# Patient Record
Sex: MALE | Race: WHITE | NOT HISPANIC OR LATINO
[De-identification: names, ages, dates, MRNs, and addresses within clinical notes are randomized per-mention and may not be internally consistent; named-entity substitution may affect disease eponyms.]

---

## 2017-10-03 ENCOUNTER — APPOINTMENT (OUTPATIENT)
Dept: UROLOGY | Facility: CLINIC | Age: 62
End: 2017-10-03
Payer: COMMERCIAL

## 2017-10-03 DIAGNOSIS — I10 ESSENTIAL (PRIMARY) HYPERTENSION: ICD-10-CM

## 2017-10-03 DIAGNOSIS — F41.9 ANXIETY DISORDER, UNSPECIFIED: ICD-10-CM

## 2017-10-03 DIAGNOSIS — Z82.0 FAMILY HISTORY OF EPILEPSY AND OTHER DISEASES OF THE NERVOUS SYSTEM: ICD-10-CM

## 2017-10-03 DIAGNOSIS — Z82.3 FAMILY HISTORY OF STROKE: ICD-10-CM

## 2017-10-03 DIAGNOSIS — Z78.9 OTHER SPECIFIED HEALTH STATUS: ICD-10-CM

## 2017-10-03 PROCEDURE — 99204 OFFICE O/P NEW MOD 45 MIN: CPT

## 2017-10-03 RX ORDER — CLONAZEPAM 1 MG/1
1 TABLET ORAL
Refills: 0 | Status: ACTIVE | COMMUNITY

## 2017-10-03 RX ORDER — PAROXETINE HYDROCHLORIDE 40 MG/1
TABLET, FILM COATED ORAL
Refills: 0 | Status: ACTIVE | COMMUNITY

## 2017-10-03 RX ORDER — IRBESARTAN 300 MG/1
TABLET, FILM COATED ORAL
Refills: 0 | Status: ACTIVE | COMMUNITY

## 2017-10-03 RX ORDER — ATENOLOL 50 MG/1
50 TABLET ORAL
Refills: 0 | Status: ACTIVE | COMMUNITY

## 2017-10-03 RX ORDER — ASPIRIN 81 MG
81 TABLET, DELAYED RELEASE (ENTERIC COATED) ORAL
Refills: 0 | Status: ACTIVE | COMMUNITY

## 2017-10-25 ENCOUNTER — MESSAGE (OUTPATIENT)
Age: 62
End: 2017-10-25

## 2017-11-22 ENCOUNTER — RX RENEWAL (OUTPATIENT)
Age: 62
End: 2017-11-22

## 2017-11-22 ENCOUNTER — APPOINTMENT (OUTPATIENT)
Dept: UROLOGY | Facility: CLINIC | Age: 62
End: 2017-11-22
Payer: COMMERCIAL

## 2017-11-22 VITALS
HEIGHT: 73 IN | DIASTOLIC BLOOD PRESSURE: 86 MMHG | HEART RATE: 70 BPM | WEIGHT: 280 LBS | SYSTOLIC BLOOD PRESSURE: 143 MMHG | BODY MASS INDEX: 37.11 KG/M2

## 2017-11-22 LAB
BILIRUB UR QL STRIP: NORMAL
CLARITY UR: CLEAR
COLLECTION METHOD: NORMAL
GLUCOSE UR-MCNC: NORMAL
HCG UR QL: NORMAL EU/DL
HGB UR QL STRIP.AUTO: NORMAL
KETONES UR-MCNC: NORMAL
LEUKOCYTE ESTERASE UR QL STRIP: 25
NITRITE UR QL STRIP: NORMAL
PH UR STRIP: NORMAL
PROT UR STRIP-MCNC: NORMAL
SP GR UR STRIP: 1.01

## 2017-11-22 PROCEDURE — 81003 URINALYSIS AUTO W/O SCOPE: CPT | Mod: QW

## 2017-11-22 PROCEDURE — 99213 OFFICE O/P EST LOW 20 MIN: CPT

## 2017-12-18 ENCOUNTER — OUTPATIENT (OUTPATIENT)
Dept: OUTPATIENT SERVICES | Facility: HOSPITAL | Age: 62
LOS: 1 days | Discharge: HOME | End: 2017-12-18

## 2017-12-18 DIAGNOSIS — F93.0 SEPARATION ANXIETY DISORDER OF CHILDHOOD: ICD-10-CM

## 2017-12-18 DIAGNOSIS — I10 ESSENTIAL (PRIMARY) HYPERTENSION: ICD-10-CM

## 2017-12-18 DIAGNOSIS — L03.90 CELLULITIS, UNSPECIFIED: ICD-10-CM

## 2017-12-22 DIAGNOSIS — R94.31 ABNORMAL ELECTROCARDIOGRAM [ECG] [EKG]: ICD-10-CM

## 2017-12-22 DIAGNOSIS — E78.4 OTHER HYPERLIPIDEMIA: ICD-10-CM

## 2017-12-22 DIAGNOSIS — F41.8 OTHER SPECIFIED ANXIETY DISORDERS: ICD-10-CM

## 2017-12-22 DIAGNOSIS — I10 ESSENTIAL (PRIMARY) HYPERTENSION: ICD-10-CM

## 2017-12-22 DIAGNOSIS — E66.9 OBESITY, UNSPECIFIED: ICD-10-CM

## 2017-12-22 DIAGNOSIS — Z82.49 FAMILY HISTORY OF ISCHEMIC HEART DISEASE AND OTHER DISEASES OF THE CIRCULATORY SYSTEM: ICD-10-CM

## 2017-12-22 DIAGNOSIS — Z87.891 PERSONAL HISTORY OF NICOTINE DEPENDENCE: ICD-10-CM

## 2018-01-25 ENCOUNTER — APPOINTMENT (OUTPATIENT)
Dept: UROLOGY | Facility: CLINIC | Age: 63
End: 2018-01-25
Payer: COMMERCIAL

## 2018-01-25 LAB
BILIRUB UR QL STRIP: NORMAL
CLARITY UR: CLEAR
COLLECTION METHOD: NORMAL
GLUCOSE UR-MCNC: NORMAL
HCG UR QL: NORMAL EU/DL
HGB UR QL STRIP.AUTO: NORMAL
KETONES UR-MCNC: NORMAL
LEUKOCYTE ESTERASE UR QL STRIP: 75
NITRITE UR QL STRIP: NORMAL
PH UR STRIP: 6.5
PROT UR STRIP-MCNC: NORMAL
SP GR UR STRIP: 1.01

## 2018-01-25 PROCEDURE — 99214 OFFICE O/P EST MOD 30 MIN: CPT

## 2018-01-25 PROCEDURE — 81003 URINALYSIS AUTO W/O SCOPE: CPT | Mod: QW

## 2018-01-25 RX ORDER — DILTIAZEM HYDROCHLORIDE 360 MG/1
360 CAPSULE, COATED, EXTENDED RELEASE ORAL
Refills: 0 | Status: COMPLETED | COMMUNITY
End: 2018-01-25

## 2018-02-01 ENCOUNTER — OUTPATIENT (OUTPATIENT)
Dept: OUTPATIENT SERVICES | Facility: HOSPITAL | Age: 63
LOS: 1 days | Discharge: HOME | End: 2018-02-01

## 2018-02-01 DIAGNOSIS — N20.2 CALCULUS OF KIDNEY WITH CALCULUS OF URETER: ICD-10-CM

## 2018-02-01 DIAGNOSIS — Z01.818 ENCOUNTER FOR OTHER PREPROCEDURAL EXAMINATION: ICD-10-CM

## 2018-02-04 DIAGNOSIS — F93.0 SEPARATION ANXIETY DISORDER OF CHILDHOOD: ICD-10-CM

## 2018-02-04 DIAGNOSIS — L03.90 CELLULITIS, UNSPECIFIED: ICD-10-CM

## 2018-02-04 DIAGNOSIS — I10 ESSENTIAL (PRIMARY) HYPERTENSION: ICD-10-CM

## 2018-02-12 ENCOUNTER — TRANSCRIPTION ENCOUNTER (OUTPATIENT)
Age: 63
End: 2018-02-12

## 2018-02-12 ENCOUNTER — APPOINTMENT (OUTPATIENT)
Dept: UROLOGY | Facility: HOSPITAL | Age: 63
End: 2018-02-12
Payer: COMMERCIAL

## 2018-02-12 ENCOUNTER — OUTPATIENT (OUTPATIENT)
Dept: OUTPATIENT SERVICES | Facility: HOSPITAL | Age: 63
LOS: 1 days | Discharge: HOME | End: 2018-02-12

## 2018-02-12 VITALS
OXYGEN SATURATION: 98 % | TEMPERATURE: 98 F | WEIGHT: 285.94 LBS | DIASTOLIC BLOOD PRESSURE: 89 MMHG | HEIGHT: 71 IN | SYSTOLIC BLOOD PRESSURE: 142 MMHG | RESPIRATION RATE: 12 BRPM | HEART RATE: 70 BPM

## 2018-02-12 VITALS — RESPIRATION RATE: 18 BRPM | DIASTOLIC BLOOD PRESSURE: 70 MMHG | SYSTOLIC BLOOD PRESSURE: 132 MMHG | HEART RATE: 67 BPM

## 2018-02-12 DIAGNOSIS — Z98.890 OTHER SPECIFIED POSTPROCEDURAL STATES: Chronic | ICD-10-CM

## 2018-02-12 PROCEDURE — 50590 FRAGMENTING OF KIDNEY STONE: CPT | Mod: LT

## 2018-02-12 RX ORDER — OXYCODONE AND ACETAMINOPHEN 5; 325 MG/1; MG/1
2 TABLET ORAL ONCE
Qty: 0 | Refills: 0 | Status: DISCONTINUED | OUTPATIENT
Start: 2018-02-12 | End: 2018-02-13

## 2018-02-12 RX ORDER — CEFUROXIME AXETIL 250 MG
1 TABLET ORAL
Qty: 6 | Refills: 0
Start: 2018-02-12 | End: 2018-02-14

## 2018-02-12 RX ORDER — KETOROLAC TROMETHAMINE 30 MG/ML
30 SYRINGE (ML) INJECTION ONCE
Qty: 0 | Refills: 0 | Status: DISCONTINUED | OUTPATIENT
Start: 2018-02-12 | End: 2018-02-13

## 2018-02-12 RX ORDER — MORPHINE SULFATE 50 MG/1
2 CAPSULE, EXTENDED RELEASE ORAL
Qty: 0 | Refills: 0 | Status: DISCONTINUED | OUTPATIENT
Start: 2018-02-12 | End: 2018-02-12

## 2018-02-12 RX ORDER — ONDANSETRON 8 MG/1
4 TABLET, FILM COATED ORAL ONCE
Qty: 0 | Refills: 0 | Status: DISCONTINUED | OUTPATIENT
Start: 2018-02-12 | End: 2018-02-13

## 2018-02-12 RX ORDER — SODIUM CHLORIDE 9 MG/ML
1000 INJECTION, SOLUTION INTRAVENOUS
Qty: 0 | Refills: 0 | Status: DISCONTINUED | OUTPATIENT
Start: 2018-02-12 | End: 2018-02-27

## 2018-02-12 RX ORDER — MORPHINE SULFATE 50 MG/1
4 CAPSULE, EXTENDED RELEASE ORAL ONCE
Qty: 0 | Refills: 0 | Status: DISCONTINUED | OUTPATIENT
Start: 2018-02-12 | End: 2018-02-13

## 2018-02-12 RX ORDER — PREGABALIN 225 MG/1
1 CAPSULE ORAL
Qty: 0 | Refills: 0 | COMMUNITY

## 2018-02-12 NOTE — PRE-ANESTHESIA EVALUATION ADULT - NSANTHOSAYNRD_GEN_A_CORE
No. RAMOS screening performed.  STOP BANG Legend: 0-2 = LOW Risk; 3-4 = INTERMEDIATE Risk; 5-8 = HIGH Risk

## 2018-02-12 NOTE — DISCHARGE NOTE ADULT - PATIENT PORTAL LINK FT
You can access the [facility] Patient Portal, offered by [facility], by registering with the following website: [email or web address]

## 2018-02-12 NOTE — ASU DISCHARGE PLAN (ADULT/PEDIATRIC). - NOTIFY
Unable to Urinate/Fever greater than 101/Bleeding that does not stop/Inability to Tolerate Liquids or Foods/Pain not relieved by Medications

## 2018-02-12 NOTE — BRIEF OPERATIVE NOTE - PRE-OP DX
Renal calculus, left  02/12/2018  10 mm mp calculus // 11x 8 mm up calculus  Active  [place] DOMÍNGUEZ

## 2018-02-12 NOTE — DISCHARGE NOTE ADULT - CARE PLAN
Principal Discharge DX:	Renal calculus, left  Goal:	home  Assessment and plan of treatment:	follow instructions and call  now office for appt in 3 weeks

## 2018-02-12 NOTE — DISCHARGE NOTE ADULT - MEDICATION SUMMARY - MEDICATIONS TO TAKE
I will START or STAY ON the medications listed below when I get home from the hospital:    Aspir 81 oral delayed release tablet  -- 1 tab(s) by mouth once a day  -- Indication: For History of cardiac cath    irbesartan 300 mg oral tablet  -- 1 tab(s) by mouth once a day  -- Indication: For Hypertension    clonazePAM 0.25 mg oral tablet, disintegrating  -- 1  by mouth once a day  -- Indication: For Anxiety    Paxil 40 mg oral tablet  -- 1 tab(s) by mouth once a day  -- Indication: For Anxiety    atenolol 50 mg oral tablet  -- 1 tab(s) by mouth once a day  -- Indication: For Hypertension    amLODIPine 10 mg oral tablet  -- 1 tab(s) by mouth once a day  -- Indication: For Hypertension    cefuroxime 500 mg oral tablet  -- 1 tab(s) by mouth 2 times a day   -- Finish all this medication unless otherwise directed by prescriber.  Medication should be taken with plenty of water.  Take with food or milk.    -- Indication: For Kidney calculi    hydroCHLOROthiazide 25 mg oral tablet  -- 1 tab(s) by mouth once a day  -- Indication: For Hypertension    Fish Oil 1000 mg oral capsule  -- 1 dose(s) orally  -- Indication: For Hypertension    oxybutynin 10 mg/24 hr oral tablet, extended release  -- 1 tab(s) by mouth once a day  -- Indication: For No idea    Multi-Day Plus Minerals oral tablet  -- 1 tab(s) by mouth once a day  -- Indication: For Hypertension

## 2018-02-21 DIAGNOSIS — N20.0 CALCULUS OF KIDNEY: ICD-10-CM

## 2018-03-01 ENCOUNTER — APPOINTMENT (OUTPATIENT)
Dept: UROLOGY | Facility: CLINIC | Age: 63
End: 2018-03-01
Payer: COMMERCIAL

## 2018-03-01 VITALS — DIASTOLIC BLOOD PRESSURE: 84 MMHG | HEART RATE: 71 BPM | SYSTOLIC BLOOD PRESSURE: 140 MMHG

## 2018-03-01 LAB
BILIRUB UR QL STRIP: NORMAL
CLARITY UR: CLEAR
COLLECTION METHOD: NORMAL
GLUCOSE UR-MCNC: NORMAL
HCG UR QL: NORMAL EU/DL
HGB UR QL STRIP.AUTO: NORMAL
KETONES UR-MCNC: NORMAL
LEUKOCYTE ESTERASE UR QL STRIP: 75
NITRITE UR QL STRIP: NORMAL
PH UR STRIP: 6
PROT UR STRIP-MCNC: NORMAL
SP GR UR STRIP: 1.01

## 2018-03-01 PROCEDURE — 99213 OFFICE O/P EST LOW 20 MIN: CPT | Mod: 25

## 2018-03-01 PROCEDURE — 81003 URINALYSIS AUTO W/O SCOPE: CPT | Mod: QW

## 2018-06-02 NOTE — ASU PREOP CHECKLIST - COMMENTS
History of Present Illness





- General


Chief Complaint: Pain


Stated Complaint: RT HAND PAIN


Time Seen by Provider: 06/02/18 01:41


History Source: Patient


Exam Limitations: No Limitations





- History of Present Illness


Initial Comments: 





06/02/18 03:12


This is a right-handed 23-year-old male without significant past medical 

history presents with right hand pain status post getting hand caught between 

clothes washer and the wall. Patient states she was helping his father move his 

washing machine 3 days ago when he lost balance causing him to tilt to the side 

catching his right hand between the washing machine and the concrete wall. 

Patient states he believed the pain and swelling would have improved without 

intervention but was concerned when the swelling remained after the 3 days.








Past History





- Past Medical History


Allergies/Adverse Reactions: 


 Allergies











Allergy/AdvReac Type Severity Reaction Status Date / Time


 


No Known Allergies Allergy   Verified 06/02/18 01:41











Home Medications: 


Ambulatory Orders





Naproxen [Naprosyn -] 500 mg PO BID PRN #20 tablet 11/11/16 








COPD: No





- Suicide/Smoking/Psychosocial Hx


Smoking History: Never smoked


Have you smoked in the past 12 months: No


Information on smoking cessation initiated: No


Hx Alcohol Use: No


Drug/Substance Use Hx: No


Substance Use Type: None





**Review of Systems





- Review of Systems


Able to Perform ROS?: Yes


Is the patient limited English proficient: No


Constitutional: No: Symptoms Reported


HEENTM: No: Symptoms Reported


Respiratory: No: Symptoms reported


Cardiac (ROS): No: Symptoms Reported


ABD/GI: No: Symptoms Reported


: No: Symptoms Reported


Musculoskeletal: Yes: See HPI


Integumentary: No: Symptoms Reported


Neurological: No: Symptoms reported





*Physical Exam





- Vital Signs


 Last Vital Signs











Temp Pulse Resp BP Pulse Ox


 


 97.6 F   88   18   142/86   99 


 


 06/02/18 01:41  06/02/18 01:41  06/02/18 01:41  06/02/18 01:41  06/02/18 01:41














- Physical Exam


Extremity: positive: Normal Capillary Refill, Swelling (Dorsal aspect of the 

right hand over the fifth metacarpal), Other (tenderness to dorsal aspect of 

the right hand over the fifth metacarpal. full sensation noted to fingers of 

the right hand)





ED Treatment Course





- RADIOLOGY


Radiology Studies Ordered: 














 Category Date Time Status


 


 HAND- RIGHT [RAD] Stat Radiology  06/02/18 01:45 Ordered














Medical Decision Making





- Medical Decision Making





06/02/18 03:18


A/P:


23-year-old right-handed male with right hand pain status post crush injury





Swelling and tenderness to the dorsum of the right hand over the fifth 

metacarpal


Full sensation noted distal to the injury


Capillary refill less than 2 seconds


No pseudoclaw present


Bony deformity palpated to the volar aspect of the fifth metacarpal





X-ray, Motrin, reassess





X-rays read by me: Mildly displaced fracture of the neck of the fifth metacarpal


Angulation less than 40





Nurse practitioner made ulnar gutter splint applied to the right hand.





I will discharge the patient home with follow-up with orthopedics.





*DC/Admit/Observation/Transfer


Diagnosis at time of Disposition: 


Fracture of fifth metacarpal bone of right hand


Qualifiers:


 Encounter type: initial encounter Fracture type: closed Metacarpal location: 

neck 








- Discharge Dispostion


Disposition: HOME


Condition at time of disposition: Stable


Decision to Admit order: No





- Referrals


Referrals: 


Brandon Andre MD [Primary Care Provider] - 


Mack Mohan MD [Staff Physician] - 





- Patient Instructions


Additional Instructions: 


Take Tylenol or Motrin as needed for pain. Follow manufacturers instructions 

for appropriate dosage.


Apply ice for 20 minutes and removed for at least 20 minutes before reapplying 

the ice.


Whenever possible keep your hand elevated to decrease swelling.


You've been given the number for an orthopedist. Make an appointment for 

evaluation within the next 5 days.


Return to emergency department for discoloration of the fingers or hand, 

numbness or tingling to the fingers or hand, worsening pain, or any other 

concerns.


Thank you very much for choosing us to provide your emergent healthcare needs.





- Post Discharge Activity


Forms/Work/School Notes:  Back to Work pills with sip of water

## 2018-06-13 ENCOUNTER — APPOINTMENT (OUTPATIENT)
Dept: UROLOGY | Facility: CLINIC | Age: 63
End: 2018-06-13
Payer: COMMERCIAL

## 2018-06-13 VITALS
HEART RATE: 78 BPM | DIASTOLIC BLOOD PRESSURE: 77 MMHG | SYSTOLIC BLOOD PRESSURE: 149 MMHG | HEIGHT: 73 IN | BODY MASS INDEX: 37.11 KG/M2 | WEIGHT: 280 LBS

## 2018-06-13 PROCEDURE — 99214 OFFICE O/P EST MOD 30 MIN: CPT

## 2018-06-13 RX ORDER — TAMSULOSIN HYDROCHLORIDE 0.4 MG/1
0.4 CAPSULE ORAL
Qty: 30 | Refills: 5 | Status: DISCONTINUED | COMMUNITY
Start: 2017-10-03 | End: 2018-06-13

## 2018-07-23 PROBLEM — Z78.9 ALCOHOL USE: Status: INACTIVE | Noted: 2017-10-03

## 2018-08-22 ENCOUNTER — APPOINTMENT (OUTPATIENT)
Dept: UROLOGY | Facility: CLINIC | Age: 63
End: 2018-08-22
Payer: COMMERCIAL

## 2018-08-22 VITALS
DIASTOLIC BLOOD PRESSURE: 81 MMHG | HEIGHT: 73 IN | SYSTOLIC BLOOD PRESSURE: 138 MMHG | BODY MASS INDEX: 37.11 KG/M2 | HEART RATE: 65 BPM | WEIGHT: 280 LBS

## 2018-08-22 PROBLEM — F41.9 ANXIETY DISORDER, UNSPECIFIED: Chronic | Status: ACTIVE | Noted: 2018-02-12

## 2018-08-22 PROBLEM — I10 ESSENTIAL (PRIMARY) HYPERTENSION: Chronic | Status: ACTIVE | Noted: 2018-02-12

## 2018-08-22 PROBLEM — N20.0 CALCULUS OF KIDNEY: Chronic | Status: ACTIVE | Noted: 2018-02-12

## 2018-08-22 PROBLEM — F32.9 MAJOR DEPRESSIVE DISORDER, SINGLE EPISODE, UNSPECIFIED: Chronic | Status: ACTIVE | Noted: 2018-02-12

## 2018-08-22 PROBLEM — D56.3 THALASSEMIA MINOR: Chronic | Status: ACTIVE | Noted: 2018-02-12

## 2018-08-22 PROCEDURE — 99213 OFFICE O/P EST LOW 20 MIN: CPT

## 2018-08-22 PROCEDURE — 51798 US URINE CAPACITY MEASURE: CPT

## 2018-11-28 ENCOUNTER — OUTPATIENT (OUTPATIENT)
Dept: OUTPATIENT SERVICES | Facility: HOSPITAL | Age: 63
LOS: 1 days | Discharge: HOME | End: 2018-11-28

## 2018-11-28 ENCOUNTER — APPOINTMENT (OUTPATIENT)
Dept: UROLOGY | Facility: CLINIC | Age: 63
End: 2018-11-28
Payer: COMMERCIAL

## 2018-11-28 VITALS
WEIGHT: 280 LBS | HEIGHT: 73 IN | SYSTOLIC BLOOD PRESSURE: 134 MMHG | BODY MASS INDEX: 37.11 KG/M2 | DIASTOLIC BLOOD PRESSURE: 73 MMHG | HEART RATE: 78 BPM

## 2018-11-28 DIAGNOSIS — N13.8 BENIGN PROSTATIC HYPERPLASIA WITH LOWER URINARY TRACT SYMPMS: ICD-10-CM

## 2018-11-28 DIAGNOSIS — Z98.890 OTHER SPECIFIED POSTPROCEDURAL STATES: Chronic | ICD-10-CM

## 2018-11-28 DIAGNOSIS — N40.1 BENIGN PROSTATIC HYPERPLASIA WITH LOWER URINARY TRACT SYMPMS: ICD-10-CM

## 2018-11-28 PROCEDURE — 99214 OFFICE O/P EST MOD 30 MIN: CPT

## 2018-11-29 DIAGNOSIS — R97.20 ELEVATED PROSTATE SPECIFIC ANTIGEN [PSA]: ICD-10-CM

## 2018-12-11 LAB — BACTERIA UR CULT: NORMAL

## 2019-02-01 ENCOUNTER — RX RENEWAL (OUTPATIENT)
Age: 64
End: 2019-02-01

## 2019-02-06 ENCOUNTER — MESSAGE (OUTPATIENT)
Age: 64
End: 2019-02-06

## 2019-02-19 ENCOUNTER — LABORATORY RESULT (OUTPATIENT)
Age: 64
End: 2019-02-19

## 2019-02-19 ENCOUNTER — APPOINTMENT (OUTPATIENT)
Dept: UROLOGY | Facility: CLINIC | Age: 64
End: 2019-02-19
Payer: COMMERCIAL

## 2019-02-19 DIAGNOSIS — R97.20 ELEVATED PROSTATE, SPECIFIC ANTIGEN [PSA]: ICD-10-CM

## 2019-02-19 PROCEDURE — 55700: CPT

## 2019-02-19 PROCEDURE — 76872 US TRANSRECTAL: CPT

## 2019-02-20 ENCOUNTER — OUTPATIENT (OUTPATIENT)
Dept: OUTPATIENT SERVICES | Facility: HOSPITAL | Age: 64
LOS: 1 days | Discharge: HOME | End: 2019-02-20

## 2019-02-20 DIAGNOSIS — Z98.890 OTHER SPECIFIED POSTPROCEDURAL STATES: Chronic | ICD-10-CM

## 2019-02-21 DIAGNOSIS — R89.7 ABNORMAL HISTOLOGICAL FINDINGS IN SPECIMENS FROM OTHER ORGANS, SYSTEMS AND TISSUES: ICD-10-CM

## 2019-04-18 ENCOUNTER — APPOINTMENT (OUTPATIENT)
Dept: UROLOGY | Facility: CLINIC | Age: 64
End: 2019-04-18
Payer: COMMERCIAL

## 2019-04-18 VITALS — HEIGHT: 73 IN | WEIGHT: 280 LBS | BODY MASS INDEX: 37.11 KG/M2

## 2019-04-18 PROCEDURE — 99214 OFFICE O/P EST MOD 30 MIN: CPT

## 2019-07-25 ENCOUNTER — APPOINTMENT (OUTPATIENT)
Dept: UROLOGY | Facility: CLINIC | Age: 64
End: 2019-07-25
Payer: COMMERCIAL

## 2019-07-25 VITALS
WEIGHT: 280 LBS | BODY MASS INDEX: 37.11 KG/M2 | HEIGHT: 73 IN | DIASTOLIC BLOOD PRESSURE: 73 MMHG | HEART RATE: 79 BPM | SYSTOLIC BLOOD PRESSURE: 124 MMHG

## 2019-07-25 PROCEDURE — 99213 OFFICE O/P EST LOW 20 MIN: CPT

## 2019-07-25 PROCEDURE — 81003 URINALYSIS AUTO W/O SCOPE: CPT | Mod: QW

## 2019-07-25 NOTE — PHYSICAL EXAM
[General Appearance - Well Developed] : well developed [General Appearance - Well Nourished] : well nourished [Normal Appearance] : normal appearance [Well Groomed] : well groomed [General Appearance - In No Acute Distress] : no acute distress [Abdomen Tenderness] : non-tender [Abdomen Soft] : soft [Costovertebral Angle Tenderness] : no ~M costovertebral angle tenderness [Urethral Meatus] : meatus normal [Skin Color & Pigmentation] : normal skin color and pigmentation [Skin Lesions] : no skin lesions [] : no respiratory distress [Respiration, Rhythm And Depth] : normal respiratory rhythm and effort [Exaggerated Use Of Accessory Muscles For Inspiration] : no accessory muscle use [Oriented To Time, Place, And Person] : oriented to person, place, and time [Affect] : the affect was normal [Mood] : the mood was normal [Not Anxious] : not anxious [Normal Station and Gait] : the gait and station were normal for the patient's age [No Focal Deficits] : no focal deficits [No Palpable Adenopathy] : no palpable adenopathy [Motor Exam] : the motor exam was normal [FreeTextEntry1] : edema UE ~~ +2

## 2019-07-25 NOTE — ASSESSMENT
[FreeTextEntry1] : #1. Prostate carcinoma, stage TI C., group 1 , very low risk\par \par Plan\par Informational booklet\par Discuss prostate carcinoma including but not limited to, progression, recurrence, treatment options such as active surveillance, robotic prostatectomy, radiation therapy, bleeding, pain, quality of life, long-term and short-term complications of the above, incontinence erectile dysfunction.\par Patient would like to proceed with active surveillance and NCCN  protocol\par PSA 3 months

## 2019-10-10 ENCOUNTER — APPOINTMENT (OUTPATIENT)
Dept: UROLOGY | Facility: CLINIC | Age: 64
End: 2019-10-10
Payer: COMMERCIAL

## 2019-10-10 VITALS
HEIGHT: 73 IN | DIASTOLIC BLOOD PRESSURE: 76 MMHG | BODY MASS INDEX: 37.11 KG/M2 | WEIGHT: 280 LBS | HEART RATE: 76 BPM | SYSTOLIC BLOOD PRESSURE: 144 MMHG

## 2019-10-10 LAB
BILIRUB UR QL STRIP: NORMAL
CLARITY UR: CLEAR
COLLECTION METHOD: NORMAL
GLUCOSE UR-MCNC: NORMAL
HCG UR QL: NORMAL EU/DL
HGB UR QL STRIP.AUTO: NORMAL
KETONES UR-MCNC: NORMAL
LEUKOCYTE ESTERASE UR QL STRIP: 75
NITRITE UR QL STRIP: NORMAL
PH UR STRIP: 5
PROT UR STRIP-MCNC: NORMAL
SP GR UR STRIP: 1.01

## 2019-10-10 PROCEDURE — 51798 US URINE CAPACITY MEASURE: CPT

## 2019-10-10 PROCEDURE — 99213 OFFICE O/P EST LOW 20 MIN: CPT

## 2019-10-10 PROCEDURE — 81003 URINALYSIS AUTO W/O SCOPE: CPT | Mod: QW

## 2019-10-29 ENCOUNTER — MESSAGE (OUTPATIENT)
Age: 64
End: 2019-10-29

## 2020-01-14 ENCOUNTER — APPOINTMENT (OUTPATIENT)
Dept: UROLOGY | Facility: CLINIC | Age: 65
End: 2020-01-14
Payer: COMMERCIAL

## 2020-01-14 PROCEDURE — 99213 OFFICE O/P EST LOW 20 MIN: CPT

## 2020-04-22 ENCOUNTER — APPOINTMENT (OUTPATIENT)
Dept: UROLOGY | Facility: CLINIC | Age: 65
End: 2020-04-22

## 2020-04-23 ENCOUNTER — APPOINTMENT (OUTPATIENT)
Dept: UROLOGY | Facility: CLINIC | Age: 65
End: 2020-04-23
Payer: COMMERCIAL

## 2020-04-23 PROCEDURE — 99214 OFFICE O/P EST MOD 30 MIN: CPT | Mod: 95

## 2020-04-23 NOTE — END OF VISIT
[FreeTextEntry3] : \par  [>50% of Time Spent on Counseling for ____] : Greater than 50% of the encounter time was spent on counseling for [unfilled] [Time Spent: ___ minutes] : I have spent [unfilled] minutes of face to face time with the patient

## 2020-04-23 NOTE — PHYSICAL EXAM
[General Appearance - Well Developed] : well developed [General Appearance - Well Nourished] : well nourished [Normal Appearance] : normal appearance [Well Groomed] : well groomed [General Appearance - In No Acute Distress] : no acute distress [Skin Lesions] : no skin lesions [Edema] : no peripheral edema [] : no respiratory distress [Oriented To Time, Place, And Person] : oriented to person, place, and time [Affect] : the affect was normal [Mood] : the mood was normal [Not Anxious] : not anxious [Normal Station and Gait] : the gait and station were normal for the patient's age [Motor Exam] : the motor exam was normal [No Focal Deficits] : no focal deficits [FreeTextEntry1] : obese

## 2020-04-23 NOTE — ASSESSMENT
[FreeTextEntry1] : #1. Prostate carcinoma, stage TI C., group 1 ,  very  low risk on AS- stable\par #2. PSA bounce\par #3..  DO -- stable off meds -doing well \par \par Plan\par -rto 3 months -- July -with repeat PSA \par -will need AS bx # 1 this yr.\par The patient-doctor relationship has been established in a face to face fashion via realtime video/audio HIPPA compliant communication using telemedicine software. The patient's identity has been confirmed .The patient was previously emailed a copy of the telemedicine consent. They have had a chance to review and has now been given verbal consent and has requested care to e assessed and treated through telemedicine and understands there may be limitations in this process and they may need further follow up care in the office and/ or  hospital settings.\par

## 2020-04-23 NOTE — HISTORY OF PRESENT ILLNESS
[Other Location: e.g. School (Enter Location, City,State)___] : at [unfilled], at the time of the visit. [Self] : self [Other Location: e.g. Home (Enter Location, City,State)___] : at [unfilled] [Patient] : the patient [Urinary Frequency] : urinary frequency [Nocturia] : nocturia [None] : None [FreeTextEntry1] : 64 -year-old male here h/o prostate cancer : stage TI C., group 1 ,  very low risk - dx 2/2019 \par on  active surveillance program\par doing well\par voiding without difficulty, \par  PSA   9.0     4/2020 \par PSA    5.6     1/2020\par           9.0    10/2019 --  ???\par           5.7    7/2019\par           6.2   11/2018-  pre- biopsy [Urinary Urgency] : no urinary urgency [Straining] : no straining [Weak Stream] : no weak stream [Dysuria] : no dysuria [Hematuria - Gross] : no gross hematuria [Fatigue] : no fatigue [Anorexia] : no anorexia

## 2020-04-23 NOTE — DISCUSSION/SUMMARY
[FreeTextEntry1] : sw pt regarding PSA elevation to 9\par per dr parkinson pt will repeat in 1/2020\par he understand

## 2020-08-11 ENCOUNTER — APPOINTMENT (OUTPATIENT)
Dept: UROLOGY | Facility: CLINIC | Age: 65
End: 2020-08-11
Payer: COMMERCIAL

## 2020-08-11 PROCEDURE — 99213 OFFICE O/P EST LOW 20 MIN: CPT

## 2020-08-11 NOTE — HISTORY OF PRESENT ILLNESS
[Nocturia] : nocturia [Urinary Frequency] : urinary frequency [None] : None [Other Location: e.g. School (Enter Location, City,State)___] : at [unfilled], at the time of the visit. [Other Location: e.g. Home (Enter Location, City,State)___] : at [unfilled] [Patient] : the patient [Self] : self [FreeTextEntry1] : 64 -year-old male here h/o prostate cancer : stage TI C., group 1 ,  very low risk - dx 2/2019 \par on  active surveillance program\par doing well\par voiding without difficulty\par PSA    6.7     8/20 \par  PSA   9.0     4/2020 \par PSA    5.6     1/2020\par           9.0    10/2019 --  ???\par           5.7    7/2019\par           6.2   11/2018-  pre- biopsy [Urinary Urgency] : no urinary urgency [Straining] : no straining [Weak Stream] : no weak stream [Dysuria] : no dysuria [Hematuria - Gross] : no gross hematuria [Fatigue] : no fatigue [Anorexia] : no anorexia

## 2020-08-11 NOTE — PHYSICAL EXAM
[General Appearance - Well Nourished] : well nourished [General Appearance - Well Developed] : well developed [Normal Appearance] : normal appearance [Well Groomed] : well groomed [General Appearance - In No Acute Distress] : no acute distress [Skin Color & Pigmentation] : normal skin color and pigmentation [Edema] : no peripheral edema [] : no respiratory distress [Skin Lesions] : no skin lesions [Oriented To Time, Place, And Person] : oriented to person, place, and time [Affect] : the affect was normal [Not Anxious] : not anxious [Mood] : the mood was normal [Normal Station and Gait] : the gait and station were normal for the patient's age [No Focal Deficits] : no focal deficits [Motor Exam] : the motor exam was normal [No Palpable Adenopathy] : no palpable adenopathy [FreeTextEntry1] : obese

## 2020-08-11 NOTE — ASSESSMENT
[FreeTextEntry1] : #1. Prostate carcinoma, stage TI C., group 1 ,  very  low risk on AS- stable\par #2. PSA bounce\par #3..  DO -- stable off meds -doing well \par \par Plan\par -rto 3 months --  repeat PSA \par -will need AS bx # 1 this yr.\par -TRUSBX / TPBx - pt agrees\par  informed consent obtained including not limiting to bleeding, pain, sepsis, UTI, complications, expectations, success, failure, abscess,  postbiopsy course, risks, antibiotic prophylaxis, indications, and information booklet.\par cipro / u/c  \par \par

## 2020-09-15 ENCOUNTER — APPOINTMENT (OUTPATIENT)
Dept: UROLOGY | Facility: CLINIC | Age: 65
End: 2020-09-15

## 2020-09-29 ENCOUNTER — APPOINTMENT (OUTPATIENT)
Dept: UROLOGY | Facility: CLINIC | Age: 65
End: 2020-09-29

## 2020-09-29 ENCOUNTER — LABORATORY RESULT (OUTPATIENT)
Age: 65
End: 2020-09-29

## 2020-09-29 ENCOUNTER — APPOINTMENT (OUTPATIENT)
Dept: UROLOGY | Facility: CLINIC | Age: 65
End: 2020-09-29
Payer: COMMERCIAL

## 2020-09-29 PROCEDURE — 55700: CPT

## 2020-09-29 PROCEDURE — 76872 US TRANSRECTAL: CPT

## 2020-10-13 ENCOUNTER — APPOINTMENT (OUTPATIENT)
Dept: UROLOGY | Facility: CLINIC | Age: 65
End: 2020-10-13
Payer: COMMERCIAL

## 2020-10-13 PROCEDURE — 99214 OFFICE O/P EST MOD 30 MIN: CPT

## 2021-01-14 ENCOUNTER — INPATIENT (INPATIENT)
Facility: HOSPITAL | Age: 66
LOS: 5 days | Discharge: HOME HEALTH | End: 2021-01-20
Attending: INTERNAL MEDICINE | Admitting: INTERNAL MEDICINE
Payer: COMMERCIAL

## 2021-01-14 VITALS
DIASTOLIC BLOOD PRESSURE: 43 MMHG | TEMPERATURE: 101 F | HEIGHT: 71 IN | RESPIRATION RATE: 20 BRPM | SYSTOLIC BLOOD PRESSURE: 147 MMHG | OXYGEN SATURATION: 97 % | HEART RATE: 90 BPM

## 2021-01-14 DIAGNOSIS — Z98.890 OTHER SPECIFIED POSTPROCEDURAL STATES: Chronic | ICD-10-CM

## 2021-01-14 LAB
ALBUMIN SERPL ELPH-MCNC: 3.7 G/DL — SIGNIFICANT CHANGE UP (ref 3.5–5.2)
ALP SERPL-CCNC: 53 U/L — SIGNIFICANT CHANGE UP (ref 30–115)
ALT FLD-CCNC: 32 U/L — SIGNIFICANT CHANGE UP (ref 0–41)
ANION GAP SERPL CALC-SCNC: 12 MMOL/L — SIGNIFICANT CHANGE UP (ref 7–14)
AST SERPL-CCNC: 57 U/L — HIGH (ref 0–41)
BASOPHILS # BLD AUTO: 0.03 K/UL — SIGNIFICANT CHANGE UP (ref 0–0.2)
BASOPHILS NFR BLD AUTO: 0.3 % — SIGNIFICANT CHANGE UP (ref 0–1)
BILIRUB SERPL-MCNC: 1 MG/DL — SIGNIFICANT CHANGE UP (ref 0.2–1.2)
BUN SERPL-MCNC: 16 MG/DL — SIGNIFICANT CHANGE UP (ref 10–20)
CALCIUM SERPL-MCNC: 9.1 MG/DL — SIGNIFICANT CHANGE UP (ref 8.5–10.1)
CHLORIDE SERPL-SCNC: 91 MMOL/L — LOW (ref 98–110)
CO2 SERPL-SCNC: 24 MMOL/L — SIGNIFICANT CHANGE UP (ref 17–32)
CREAT SERPL-MCNC: 0.9 MG/DL — SIGNIFICANT CHANGE UP (ref 0.7–1.5)
D DIMER BLD IA.RAPID-MCNC: 664 NG/ML DDU — HIGH (ref 0–230)
EOSINOPHIL # BLD AUTO: 0 K/UL — SIGNIFICANT CHANGE UP (ref 0–0.7)
EOSINOPHIL NFR BLD AUTO: 0 % — SIGNIFICANT CHANGE UP (ref 0–8)
GLUCOSE SERPL-MCNC: 130 MG/DL — HIGH (ref 70–99)
HCT VFR BLD CALC: 35.7 % — LOW (ref 42–52)
HGB BLD-MCNC: 11.9 G/DL — LOW (ref 14–18)
IMM GRANULOCYTES NFR BLD AUTO: 0.8 % — HIGH (ref 0.1–0.3)
LYMPHOCYTES # BLD AUTO: 0.74 K/UL — LOW (ref 1.2–3.4)
LYMPHOCYTES # BLD AUTO: 7 % — LOW (ref 20.5–51.1)
MCHC RBC-ENTMCNC: 22.9 PG — LOW (ref 27–31)
MCHC RBC-ENTMCNC: 33.3 G/DL — SIGNIFICANT CHANGE UP (ref 32–37)
MCV RBC AUTO: 68.8 FL — LOW (ref 80–94)
MONOCYTES # BLD AUTO: 0.44 K/UL — SIGNIFICANT CHANGE UP (ref 0.1–0.6)
MONOCYTES NFR BLD AUTO: 4.2 % — SIGNIFICANT CHANGE UP (ref 1.7–9.3)
NEUTROPHILS # BLD AUTO: 9.3 K/UL — HIGH (ref 1.4–6.5)
NEUTROPHILS NFR BLD AUTO: 87.7 % — HIGH (ref 42.2–75.2)
NRBC # BLD: 0 /100 WBCS — SIGNIFICANT CHANGE UP (ref 0–0)
PLATELET # BLD AUTO: 146 K/UL — SIGNIFICANT CHANGE UP (ref 130–400)
POTASSIUM SERPL-MCNC: 4.1 MMOL/L — SIGNIFICANT CHANGE UP (ref 3.5–5)
POTASSIUM SERPL-SCNC: 4.1 MMOL/L — SIGNIFICANT CHANGE UP (ref 3.5–5)
PROT SERPL-MCNC: 6.6 G/DL — SIGNIFICANT CHANGE UP (ref 6–8)
RBC # BLD: 5.19 M/UL — SIGNIFICANT CHANGE UP (ref 4.7–6.1)
RBC # FLD: 15.3 % — HIGH (ref 11.5–14.5)
SARS-COV-2 RNA SPEC QL NAA+PROBE: DETECTED
SODIUM SERPL-SCNC: 127 MMOL/L — LOW (ref 135–146)
WBC # BLD: 10.6 K/UL — SIGNIFICANT CHANGE UP (ref 4.8–10.8)
WBC # FLD AUTO: 10.6 K/UL — SIGNIFICANT CHANGE UP (ref 4.8–10.8)

## 2021-01-14 PROCEDURE — 71045 X-RAY EXAM CHEST 1 VIEW: CPT | Mod: 26

## 2021-01-14 PROCEDURE — 99285 EMERGENCY DEPT VISIT HI MDM: CPT | Mod: CS

## 2021-01-14 PROCEDURE — 93010 ELECTROCARDIOGRAM REPORT: CPT

## 2021-01-14 PROCEDURE — 99223 1ST HOSP IP/OBS HIGH 75: CPT | Mod: CS

## 2021-01-14 RX ORDER — AMLODIPINE BESYLATE 2.5 MG/1
10 TABLET ORAL DAILY
Refills: 0 | Status: DISCONTINUED | OUTPATIENT
Start: 2021-01-14 | End: 2021-01-20

## 2021-01-14 RX ORDER — ASPIRIN/CALCIUM CARB/MAGNESIUM 324 MG
1 TABLET ORAL
Qty: 0 | Refills: 0 | DISCHARGE

## 2021-01-14 RX ORDER — DEXAMETHASONE 0.5 MG/5ML
6 ELIXIR ORAL ONCE
Refills: 0 | Status: COMPLETED | OUTPATIENT
Start: 2021-01-14 | End: 2021-01-14

## 2021-01-14 RX ORDER — ATENOLOL 25 MG/1
50 TABLET ORAL DAILY
Refills: 0 | Status: DISCONTINUED | OUTPATIENT
Start: 2021-01-14 | End: 2021-01-20

## 2021-01-14 RX ORDER — SODIUM CHLORIDE 9 MG/ML
1000 INJECTION, SOLUTION INTRAVENOUS ONCE
Refills: 0 | Status: COMPLETED | OUTPATIENT
Start: 2021-01-14 | End: 2021-01-14

## 2021-01-14 RX ORDER — REMDESIVIR 5 MG/ML
100 INJECTION INTRAVENOUS EVERY 24 HOURS
Refills: 0 | Status: COMPLETED | OUTPATIENT
Start: 2021-01-15 | End: 2021-01-18

## 2021-01-14 RX ORDER — REMDESIVIR 5 MG/ML
200 INJECTION INTRAVENOUS EVERY 24 HOURS
Refills: 0 | Status: COMPLETED | OUTPATIENT
Start: 2021-01-14 | End: 2021-01-14

## 2021-01-14 RX ORDER — OMEGA-3 ACID ETHYL ESTERS 1 G
1 CAPSULE ORAL
Qty: 0 | Refills: 0 | DISCHARGE

## 2021-01-14 RX ORDER — REMDESIVIR 5 MG/ML
INJECTION INTRAVENOUS
Refills: 0 | Status: COMPLETED | OUTPATIENT
Start: 2021-01-14 | End: 2021-01-18

## 2021-01-14 RX ORDER — ACETAMINOPHEN 500 MG
650 TABLET ORAL EVERY 4 HOURS
Refills: 0 | Status: DISCONTINUED | OUTPATIENT
Start: 2021-01-14 | End: 2021-01-20

## 2021-01-14 RX ORDER — DEXAMETHASONE 0.5 MG/5ML
6 ELIXIR ORAL DAILY
Refills: 0 | Status: DISCONTINUED | OUTPATIENT
Start: 2021-01-15 | End: 2021-01-20

## 2021-01-14 RX ORDER — ENOXAPARIN SODIUM 100 MG/ML
40 INJECTION SUBCUTANEOUS DAILY
Refills: 0 | Status: DISCONTINUED | OUTPATIENT
Start: 2021-01-14 | End: 2021-01-14

## 2021-01-14 RX ORDER — OXYBUTYNIN CHLORIDE 5 MG
1 TABLET ORAL
Qty: 0 | Refills: 0 | DISCHARGE

## 2021-01-14 RX ORDER — LOSARTAN POTASSIUM 100 MG/1
100 TABLET, FILM COATED ORAL DAILY
Refills: 0 | Status: DISCONTINUED | OUTPATIENT
Start: 2021-01-14 | End: 2021-01-20

## 2021-01-14 RX ORDER — PANTOPRAZOLE SODIUM 20 MG/1
40 TABLET, DELAYED RELEASE ORAL
Refills: 0 | Status: DISCONTINUED | OUTPATIENT
Start: 2021-01-14 | End: 2021-01-20

## 2021-01-14 RX ORDER — ENOXAPARIN SODIUM 100 MG/ML
40 INJECTION SUBCUTANEOUS EVERY 12 HOURS
Refills: 0 | Status: DISCONTINUED | OUTPATIENT
Start: 2021-01-14 | End: 2021-01-20

## 2021-01-14 RX ORDER — SODIUM CHLORIDE 9 MG/ML
1000 INJECTION, SOLUTION INTRAVENOUS
Refills: 0 | Status: DISCONTINUED | OUTPATIENT
Start: 2021-01-14 | End: 2021-01-17

## 2021-01-14 RX ORDER — ACETAMINOPHEN 500 MG
975 TABLET ORAL ONCE
Refills: 0 | Status: COMPLETED | OUTPATIENT
Start: 2021-01-14 | End: 2021-01-14

## 2021-01-14 RX ORDER — CLONAZEPAM 1 MG
1 TABLET ORAL
Qty: 0 | Refills: 0 | DISCHARGE

## 2021-01-14 RX ORDER — MULTIVIT-MIN/FERROUS GLUCONATE 9 MG/15 ML
1 LIQUID (ML) ORAL
Qty: 0 | Refills: 0 | DISCHARGE

## 2021-01-14 RX ORDER — SENNA PLUS 8.6 MG/1
2 TABLET ORAL AT BEDTIME
Refills: 0 | Status: DISCONTINUED | OUTPATIENT
Start: 2021-01-14 | End: 2021-01-20

## 2021-01-14 RX ADMIN — Medication 6 MILLIGRAM(S): at 14:38

## 2021-01-14 RX ADMIN — REMDESIVIR 500 MILLIGRAM(S): 5 INJECTION INTRAVENOUS at 17:19

## 2021-01-14 RX ADMIN — Medication 1 TABLET(S): at 17:19

## 2021-01-14 RX ADMIN — Medication 975 MILLIGRAM(S): at 08:20

## 2021-01-14 RX ADMIN — ATENOLOL 50 MILLIGRAM(S): 25 TABLET ORAL at 17:20

## 2021-01-14 RX ADMIN — LOSARTAN POTASSIUM 100 MILLIGRAM(S): 100 TABLET, FILM COATED ORAL at 17:19

## 2021-01-14 RX ADMIN — Medication 650 MILLIGRAM(S): at 17:20

## 2021-01-14 RX ADMIN — Medication 30 MILLIGRAM(S): at 17:19

## 2021-01-14 RX ADMIN — SODIUM CHLORIDE 1000 MILLILITER(S): 9 INJECTION, SOLUTION INTRAVENOUS at 12:27

## 2021-01-14 RX ADMIN — SODIUM CHLORIDE 75 MILLILITER(S): 9 INJECTION, SOLUTION INTRAVENOUS at 23:56

## 2021-01-14 NOTE — H&P ADULT - ATTENDING COMMENTS
HPI:  66 yo gentleman with a PMHx of HTN, prostate ca presents with COVID19 PNA. Patient tested (+) 1/6 and has since experienced viral syndrome starting at that time with weakness, subjective fever, chills. He reports poor PO intake for the last three days but no diarrhea, nausea, or vomiting. He got out of bed today and fell because he was feeling weak and was unable to get back up. Otherwise he denied shortness of breath, chest pain, palpitations.     REVIEW OF SYSTEMS:  CONSTITUTIONAL:  + weakness, fevers, chills, no night sweats, weight loss  EYES/ENT: No visual changes. No vertigo or dysphagia  NECK: No neck pain or stiffness  RESPIRATORY: No cough, wheezing, hemoptysis. No shortness of breath  CARDIOVASCULAR: No chest pain or palpitations. No lower extremity edema  GASTROINTESTINAL: No abdominal pain. No nausea, vomiting, diarrhea, or hematemesis. + poor appetite  GENITOURINARY: No dysuria or hematuria   NEUROLOGICAL: No focal numbness or weakness  SKIN: No rashes or itching  HEMATOLOGIC: No easy bruising or prolonged bleeding.      PHYSICAL EXAM:  GENERAL: NAD, obese, Non-toxic, stated age   HEAD:  Atraumatic, Normocephalic  EYES: EOMI, Sclera White   NECK: Supple, No JVD  CHEST/LUNG: b/l crackles at lower bases; No wheezing, rhonchi  HEART: Regular rate and rhythm; s1, s2, No murmurs, rubs, or gallops  ABDOMEN: Soft, Nontender, Nondistended; Bowel sounds present, No rebound or guarding noted   EXTREMITIES:  No lower extremity edema or calf tenderness to palpation.  No clubbing or cyanosis  PSYCH: AAOx3, pleasant, cooperative, not anxious  NEUROLOGY: non-focal  SKIN: No rashes or lesions      ASSESSMENT AND PLAN:  Acute Hypoxic Respiratory Failure secondary to COVID-19 Pneumonia: SIRS present on admission. ID consulted for possible plasma (patient is at the 7 day ericka), will get type and screen, cont with Remdesivir 200mg x1, 100mg qD, Dexamethasone 6mg q8 given he was previously requiring oxygen and his age. Follow up inflammatory markers (Ferritin, LDH, CRP, ESR, D-Dimer) and procalcitonin. Doubt superimposed bacterial pneumonia at this time. Supplemental O2 as needed.    mechanical fall secondary to Asthenia: Pt/rehab consult, continue with IVF for 1 day. Encourage good oral intake, nutrition evaluation.     Elevated D-Dimer: check VA duplex (patient has been sedentary for a long time at home)    Hyponatremia/hypochloremia: likely secondary to decreased oral intake/hypovolemia. s//p IVF, follow up sodium, if worsening then will need Urine sodium, urine and serum osmo, and check his TSH.     Transaminitis: Likely secondary to COVID    HTN: Cont with home medications   DVT ppx: Lovenox/Heparin  GI ppx: Not indicated  GOC: Full code.      My note supersedes the residents in the event of a discrepancy.

## 2021-01-14 NOTE — H&P ADULT - NSHPLABSRESULTS_GEN_ALL_CORE
11.9   10.60 )-----------( 146      ( 14 Jan 2021 12:00 )             35.7       01-14    127<L>  |  91<L>  |  16  ----------------------------<  130<H>  4.1   |  24  |  0.9    Ca    9.1      14 Jan 2021 12:00    TPro  6.6  /  Alb  3.7  /  TBili  1.0  /  DBili  x   /  AST  57<H>  /  ALT  32  /  AlkPhos  53  01-14                      Lactate Trend  CAPILLARY BLOOD GLUCOSE

## 2021-01-14 NOTE — ED ADULT TRIAGE NOTE - CHIEF COMPLAINT QUOTE
pt BIBA from home s/p fall at home from standing, no AC/LOC/vomiting. Pt is COVID + felt dizzy when he stood and fell back. As per EMS pt was unable to walk at home due to dizzy/weakness and O2 Sat 80s on room air - pt placed on O2 via NC @ 4L/min

## 2021-01-14 NOTE — ED PROVIDER NOTE - OBJECTIVE STATEMENT
64 yo male with a pmh of HTN and prostate CA presents c/o fall. pt states he was tested covid+ on 1/06 and has been experiencing weakness/fevers/chills. pt stats today due to weakness he fell and had trouble getting up. denies any head trauma/LOC/HA. denies any other symptoms including headache, recent illness/travel, cough, abdominal pain, chest pain, or SOB.

## 2021-01-14 NOTE — H&P ADULT - ASSESSMENT
66 yo male with PMHx of HTN, prostate ca presents with COVID19 PNA.    #Acute hypoxic respiratory failure  Mechanical fall 2/2 weakness  #Severe COVID-19 PNA  - COVID PCR (+) 1/6  - Requiring 4L NC to maintain adequate saturation  - Start dexamethasone (1/14- )  - Start RDV (1/14-18)  - Inflammatory markers  -- D-dimer 664  -- F/u ESR, CRP, Ferritin  - F/u ID evaluation    #HTN      GI ppx:  [] Not indicated  /  [x] Pantoprazole 40mg PO Daily  DVT ppx: [] Not indicated  /  [] Heparin 5000mg SubQ  /  [x] Lovenox 40mg SubQ  /  [] SCDs  Fluids:  [x] PO  /  [] IVF  Activity:  [x] Increase as Tolerated  /  [] OOB w/ assist  /  [] Bed Rest  Diet:  [x] DASH / [] Carb Consistent  /  [] Renal  /  [] Dysphagia  /  [] NPO  Patient to be discharged when condition(s) optimized:  [x] Home  / [] SNF  /  [] Long Term Rehab    CODE STATUS:   [x] FULL   /   [] DNR   66 yo male with PMHx of HTN, prostate ca presents with COVID19 PNA.    #Acute hypoxic respiratory failure  Mechanical fall 2/2 weakness  #Severe COVID-19 PNA  - COVID PCR (+) 1/6  - Requiring 4L NC to maintain adequate saturation  - Start dexamethasone (1/14- )  - Start RDV (1/14-18)  - Inflammatory markers  -- D-dimer 664  -- F/u ESR, CRP, Ferritin  - F/u ID evaluation  - F/u PT/rehab    #HTN- c/w home meds    #Anxiety- c/w paroxetine    #H/o Prostate cancer  - Outpt oncology follow up      GI ppx:  [] Not indicated  /  [x] Pantoprazole 40mg PO Daily  DVT ppx: [] Not indicated  /  [] Heparin 5000mg SubQ  /  [x] Lovenox 40mg SubQ  /  [] SCDs  Fluids:  [x] PO  /  [] IVF  Activity:  [x] Increase as Tolerated  /  [] OOB w/ assist  /  [] Bed Rest  Diet:  [x] DASH / [] Carb Consistent  /  [] Renal  /  [] Dysphagia  /  [] NPO  Patient to be discharged when condition(s) optimized:  [x] Home  / [] SNF  /  [] Long Term Rehab    CODE STATUS:   [x] FULL   /   [] DNR   66 yo male with PMHx of HTN, prostate ca presents with COVID19 PNA.    #Acute hypoxic respiratory failure  #Mechanical fall 2/2 weakness  #Severe COVID-19 PNA  - COVID PCR (+) 1/6  - Requiring 4L NC to maintain adequate saturation  - Start dexamethasone (1/14- )  - Start RDV (1/14-18)  - Inflammatory markers  -- D-dimer 664  -- F/u ESR, CRP, Ferritin  - F/u ID evaluation  - F/u PT/rehab    #HTN- c/w home meds    #Anxiety- c/w paroxetine    #H/o Prostate cancer  - Outpt oncology follow up      GI ppx:  [] Not indicated  /  [x] Pantoprazole 40mg PO Daily  DVT ppx: [] Not indicated  /  [] Heparin 5000mg SubQ  /  [x] Lovenox 40mg SubQ  /  [] SCDs  Fluids:  [x] PO  /  [] IVF  Activity:  [x] Increase as Tolerated  /  [] OOB w/ assist  /  [] Bed Rest  Diet:  [x] DASH / [] Carb Consistent  /  [] Renal  /  [] Dysphagia  /  [] NPO  Patient to be discharged when condition(s) optimized:  [x] Home  / [] SNF  /  [] Long Term Rehab    CODE STATUS:   [x] FULL   /   [] DNR

## 2021-01-14 NOTE — H&P ADULT - NSHPPHYSICALEXAM_GEN_ALL_CORE
CONSTITUTIONAL: No acute distress, well-developed, well-groomed, AAOx3  HEAD: Atraumatic, normocephalic  EYES: EOM intact, PERRLA, conjunctiva and sclera clear  ENT: Supple, no masses, no thyromegaly, no bruits, no JVD; moist mucous membranes  PULMONARY: Clear to auscultation bilaterally; no wheezes, rales, or rhonchi. (+) labored breathing  CARDIOVASCULAR: Regular rate and rhythm; no murmurs, rubs, or gallops  GASTROINTESTINAL: Soft, non-tender, non-distended; bowel sounds present  MUSCULOSKELETAL: 2+ peripheral pulses; no clubbing, no cyanosis, no edema  NEUROLOGY: non-focal  SKIN: No rashes or lesions; warm and dry

## 2021-01-14 NOTE — H&P ADULT - HISTORY OF PRESENT ILLNESS
66 yo male with PMHx of HTN, prostate ca presents with COVID19 PNA. Patient tested (+) 1/6 and has since experienced viral syndrome starting at that time with weakness, subjective fever, chills. This AM, patient fell due to weakness and had difficulty getting back up. He denies any head trauma or LOC. He denies any SOB, nausea, vomiting, diarrhea.    In ED T101, HR90, /43, RR20, SpO2 97% on 4L NC (80% on RA per EMS). COVID PCR (+), CXR c/w viral PNA. Patient admitted to medicine with working diagnosis of acute hypoxic respiratory failure 2/2 COVID19 PNA. 66 yo male with PMHx of HTN, prostate ca presents with COVID19 PNA. Patient tested (+) 1/6 and has since experienced viral syndrome starting at that time with weakness, subjective fever, chills. This AM, patient fell due to weakness and had difficulty getting back up. He denies any head trauma or LOC. He denies any previous fall at home. He denies any SOB, nausea, vomiting, diarrhea.    In ED T101, HR90, /43, RR20, SpO2 97% on 4L NC (80% on RA per EMS). COVID PCR (+), CXR c/w viral PNA. Patient admitted to medicine with working diagnosis of acute hypoxic respiratory failure 2/2 COVID19 PNA.

## 2021-01-14 NOTE — H&P ADULT - NSHPSOCIALHISTORY_GEN_ALL_CORE
Marital Status:   Living Situation: Home  Occupation: Gives out samples at [employer]  Tobacco Use: Denies  Alcohol Use: Denies  Drug Use: Denies

## 2021-01-14 NOTE — ED PROVIDER NOTE - CLINICAL SUMMARY MEDICAL DECISION MAKING FREE TEXT BOX
Patient presents with covid symptoms and weakness. Found to be hypoxic. labs, ekg, cxr done. Admitted for further management.

## 2021-01-14 NOTE — H&P ADULT - NSICDXPASTMEDICALHX_GEN_ALL_CORE_FT
PAST MEDICAL HISTORY:  Anxiety     Depressed     Hypertension     Kidney calculi     Prostate cancer     Thalassemia minor

## 2021-01-14 NOTE — H&P ADULT - NSHPREVIEWOFSYSTEMS_GEN_ALL_CORE
CONSTITUTIONAL: No fevers or chills; No headaches (+) weakness per HPI  EYES: No visual changes, eye pain, or discharge  ENT: No vertigo; No ear pain or change in hearing; No sore throat or difficulty swallowing  NECK: No pain or stiffness  RESPIRATORY: No wheezing, or hemoptysis; (+) wheezing, SOB per HPI  CARDIOVASCULAR: No chest pain or palpitations  GASTROINTESTINAL: No abdominal or epigastric pain; No nausea, vomiting, or hematemesis; No constipation; No melena or hematochezia; (+) diarrhea per HPI  GENITOURINARY: No dysuria, frequency or hematuria  MUSCULOSKELETAL: No joint pain, no muscle pain,  NEUROLOGICAL: No numbness or weakness  SKIN: No itching or rashes

## 2021-01-14 NOTE — ED ADULT NURSE NOTE - NS ED NURSE LEVEL OF CONSCIOUSNESS SPEECH
Speaking Coherently
This is a 49 y/o single,disabled,b/f from Assisted living facility,with h/o Schizophernia was admitted with acute kidney failure.  Patient was interviewed.Chart was reviewed.Case was discussed with MD.Was not   able to contact with a nurse at Assisted living facility.  Patient stated:"I am here due to kidneys"."I have my psychiatrist." "I used to take haldol,Risperdal"  Patient is currently on Cogentin only.  Patient is c/o racing thoughts,mood swings.  Denied using alcohol.  Patient is a/o x3.cooperative,verbal,behaviorally controlled.Speech is goal directed,logical.  Denied s/h thought.Denied a/v hallucinations.memory and cognition-fair.I&J-fair.

## 2021-01-14 NOTE — ED PROVIDER NOTE - PHYSICAL EXAMINATION
Gen: NAD, AOx3  Head: NCAT  HEENT: PERRL, oral mucosa moist, normal conjunctiva, oropharynx clear without exudate or erythema  Lung: CTAB, no respiratory distress, no wheezing, rales, rhonchi  CV: normal s1/s2, rrr, Normal perfusion, pulses 2+ throughout  Abd: soft, NTND, no CVA tenderness  Genitourinary: no pelvic tenderness  MSK: No edema, no visible deformities, full range of motion in all 4 extremities  Neuro: CN II-XII grossly intact, No focal neurologic deficits, strength 5/5 BUE/BLE  Skin: No rash   Psych: normal affect
none

## 2021-01-14 NOTE — ED PROVIDER NOTE - NS ED ROS FT
Constitutional: (+) fever  Eyes/ENT: (-) visual changes   Cardiovascular: (-) chest pain, (-) syncope  Respiratory: (-) cough, (-) shortness of breath  Gastrointestinal: (-) vomiting, (-) diarrhea  Genitourinary: (-) dysuria, (-) hesitancy, (-) frequency   Musculoskeletal: (-) neck pain, (-) back pain, (-) joint pain, weakness  Integumentary: (-) rash, (-) edema  Neurological: (-) headache, (-) altered mental status  Allergic/Immunologic: (-) pruritus

## 2021-01-14 NOTE — ED ADULT NURSE NOTE - OBJECTIVE STATEMENT
pt states that he fell @ approx 4AM today, states he hit his head on a lamp, denies loc, visual changes, numbness or tingling, no apparent injury or deformity noted, pt denies pain or other symptoms, states he has been feeling weak since he was diagnosed with covid on the 7th

## 2021-01-14 NOTE — ED PROVIDER NOTE - ATTENDING CONTRIBUTION TO CARE
64 yo M pmh of HTN, prostate ca presents with covid symptoms. States that he became symptomatic 10 days ago, tested positive on the 6th. At home has been having fever, chills, generalized weakness. no n/v/d, no uri symptoms. States that the last few days he has been more weak. He got out of bed this morning and collapsed from weakness. no head trauma, no loc, no blood thinners. Denies any pain from the fall. Found to be hypoxic to the 80s, improved with oxygen.     CONSTITUTIONAL: Well-developed; well-nourished; in no acute distress.   SKIN: warm, dry  HEAD: Normocephalic; atraumatic.  EYES: PERRL, EOMI, no conjunctival erythema  ENT: No nasal discharge; airway clear.  NECK: Supple; non tender.  CARD: S1, S2 normal;  Regular rate and rhythm.   RESP: No wheezes, rales or rhonchi. speaking full sentences, non labored breathing.   ABD: soft non tender, non distended, no rebound or guarding  EXT: Normal ROM.  5/5 strength in all 4 extremities   LYMPH: No acute cervical adenopathy.  NEURO: Alert, oriented, grossly unremarkable. neurovascularly intact  PSYCH: Cooperative, appropriate.

## 2021-01-15 LAB
ALBUMIN SERPL ELPH-MCNC: 3.4 G/DL — LOW (ref 3.5–5.2)
ALP SERPL-CCNC: 57 U/L — SIGNIFICANT CHANGE UP (ref 30–115)
ALT FLD-CCNC: 55 U/L — HIGH (ref 0–41)
ANION GAP SERPL CALC-SCNC: 11 MMOL/L — SIGNIFICANT CHANGE UP (ref 7–14)
AST SERPL-CCNC: 84 U/L — HIGH (ref 0–41)
BASOPHILS # BLD AUTO: 0.02 K/UL — SIGNIFICANT CHANGE UP (ref 0–0.2)
BASOPHILS NFR BLD AUTO: 0.2 % — SIGNIFICANT CHANGE UP (ref 0–1)
BILIRUB SERPL-MCNC: 0.6 MG/DL — SIGNIFICANT CHANGE UP (ref 0.2–1.2)
BLD GP AB SCN SERPL QL: SIGNIFICANT CHANGE UP
BUN SERPL-MCNC: 19 MG/DL — SIGNIFICANT CHANGE UP (ref 10–20)
CALCIUM SERPL-MCNC: 9.1 MG/DL — SIGNIFICANT CHANGE UP (ref 8.5–10.1)
CHLORIDE SERPL-SCNC: 92 MMOL/L — LOW (ref 98–110)
CO2 SERPL-SCNC: 26 MMOL/L — SIGNIFICANT CHANGE UP (ref 17–32)
CREAT SERPL-MCNC: 0.9 MG/DL — SIGNIFICANT CHANGE UP (ref 0.7–1.5)
CRP SERPL-MCNC: 27.99 MG/DL — HIGH (ref 0–0.4)
D DIMER BLD IA.RAPID-MCNC: 894 NG/ML DDU — HIGH (ref 0–230)
EOSINOPHIL # BLD AUTO: 0 K/UL — SIGNIFICANT CHANGE UP (ref 0–0.7)
EOSINOPHIL NFR BLD AUTO: 0 % — SIGNIFICANT CHANGE UP (ref 0–8)
FERRITIN SERPL-MCNC: 1163 NG/ML — HIGH (ref 30–400)
GLUCOSE SERPL-MCNC: 225 MG/DL — HIGH (ref 70–99)
HCT VFR BLD CALC: 37.9 % — LOW (ref 42–52)
HCV AB S/CO SERPL IA: 0.03 COI — SIGNIFICANT CHANGE UP
HCV AB SERPL-IMP: SIGNIFICANT CHANGE UP
HGB BLD-MCNC: 12.2 G/DL — LOW (ref 14–18)
IMM GRANULOCYTES NFR BLD AUTO: 0.7 % — HIGH (ref 0.1–0.3)
LYMPHOCYTES # BLD AUTO: 0.52 K/UL — LOW (ref 1.2–3.4)
LYMPHOCYTES # BLD AUTO: 4.7 % — LOW (ref 20.5–51.1)
MCHC RBC-ENTMCNC: 22.5 PG — LOW (ref 27–31)
MCHC RBC-ENTMCNC: 32.2 G/DL — SIGNIFICANT CHANGE UP (ref 32–37)
MCV RBC AUTO: 69.8 FL — LOW (ref 80–94)
MONOCYTES # BLD AUTO: 0.41 K/UL — SIGNIFICANT CHANGE UP (ref 0.1–0.6)
MONOCYTES NFR BLD AUTO: 3.7 % — SIGNIFICANT CHANGE UP (ref 1.7–9.3)
NEUTROPHILS # BLD AUTO: 10.11 K/UL — HIGH (ref 1.4–6.5)
NEUTROPHILS NFR BLD AUTO: 90.7 % — HIGH (ref 42.2–75.2)
NRBC # BLD: 0 /100 WBCS — SIGNIFICANT CHANGE UP (ref 0–0)
PLATELET # BLD AUTO: 159 K/UL — SIGNIFICANT CHANGE UP (ref 130–400)
POTASSIUM SERPL-MCNC: 4.1 MMOL/L — SIGNIFICANT CHANGE UP (ref 3.5–5)
POTASSIUM SERPL-SCNC: 4.1 MMOL/L — SIGNIFICANT CHANGE UP (ref 3.5–5)
PROCALCITONIN SERPL-MCNC: 1.41 NG/ML — HIGH (ref 0.02–0.1)
PROT SERPL-MCNC: 6.5 G/DL — SIGNIFICANT CHANGE UP (ref 6–8)
RBC # BLD: 5.43 M/UL — SIGNIFICANT CHANGE UP (ref 4.7–6.1)
RBC # FLD: 15.6 % — HIGH (ref 11.5–14.5)
SODIUM SERPL-SCNC: 129 MMOL/L — LOW (ref 135–146)
WBC # BLD: 11.14 K/UL — HIGH (ref 4.8–10.8)
WBC # FLD AUTO: 11.14 K/UL — HIGH (ref 4.8–10.8)

## 2021-01-15 PROCEDURE — 93970 EXTREMITY STUDY: CPT | Mod: 26

## 2021-01-15 PROCEDURE — 99233 SBSQ HOSP IP/OBS HIGH 50: CPT | Mod: CS

## 2021-01-15 RX ORDER — CEFTRIAXONE 500 MG/1
2000 INJECTION, POWDER, FOR SOLUTION INTRAMUSCULAR; INTRAVENOUS EVERY 24 HOURS
Refills: 0 | Status: DISCONTINUED | OUTPATIENT
Start: 2021-01-15 | End: 2021-01-18

## 2021-01-15 RX ORDER — CEFTRIAXONE 500 MG/1
1000 INJECTION, POWDER, FOR SOLUTION INTRAMUSCULAR; INTRAVENOUS EVERY 24 HOURS
Refills: 0 | Status: DISCONTINUED | OUTPATIENT
Start: 2021-01-15 | End: 2021-01-15

## 2021-01-15 RX ADMIN — Medication 650 MILLIGRAM(S): at 19:35

## 2021-01-15 RX ADMIN — AMLODIPINE BESYLATE 10 MILLIGRAM(S): 2.5 TABLET ORAL at 05:54

## 2021-01-15 RX ADMIN — Medication 30 MILLIGRAM(S): at 12:57

## 2021-01-15 RX ADMIN — PANTOPRAZOLE SODIUM 40 MILLIGRAM(S): 20 TABLET, DELAYED RELEASE ORAL at 05:55

## 2021-01-15 RX ADMIN — LOSARTAN POTASSIUM 100 MILLIGRAM(S): 100 TABLET, FILM COATED ORAL at 05:55

## 2021-01-15 RX ADMIN — SODIUM CHLORIDE 75 MILLILITER(S): 9 INJECTION, SOLUTION INTRAVENOUS at 20:06

## 2021-01-15 RX ADMIN — ATENOLOL 50 MILLIGRAM(S): 25 TABLET ORAL at 05:54

## 2021-01-15 RX ADMIN — Medication 1 TABLET(S): at 12:57

## 2021-01-15 RX ADMIN — ENOXAPARIN SODIUM 40 MILLIGRAM(S): 100 INJECTION SUBCUTANEOUS at 05:56

## 2021-01-15 RX ADMIN — CEFTRIAXONE 100 MILLIGRAM(S): 500 INJECTION, POWDER, FOR SOLUTION INTRAMUSCULAR; INTRAVENOUS at 12:56

## 2021-01-15 RX ADMIN — ENOXAPARIN SODIUM 40 MILLIGRAM(S): 100 INJECTION SUBCUTANEOUS at 17:01

## 2021-01-15 RX ADMIN — Medication 650 MILLIGRAM(S): at 05:57

## 2021-01-15 RX ADMIN — REMDESIVIR 500 MILLIGRAM(S): 5 INJECTION INTRAVENOUS at 17:01

## 2021-01-15 RX ADMIN — Medication 650 MILLIGRAM(S): at 12:34

## 2021-01-15 RX ADMIN — Medication 6 MILLIGRAM(S): at 05:55

## 2021-01-15 NOTE — PATIENT PROFILE ADULT - FALL HARM RISK CONCLUSION

## 2021-01-15 NOTE — PROGRESS NOTE ADULT - SUBJECTIVE AND OBJECTIVE BOX
FORTINO CHIN  65y  Male      Patient is a 65y old  Male who presents with a chief complaint of COVID19 (15 Brandon 2021 12:02)      INTERVAL HPI/OVERNIGHT EVENTS: sob, weak, coughing      REVIEW OF SYSTEMS:  as above  All other review of systems negative    T(C): 36.1 (01-15-21 @ 12:31), Max: 39.7 (01-15-21 @ 05:15)  HR: 91 (01-15-21 @ 12:31) (73 - 91)  BP: 119/73 (01-15-21 @ 12:31) (118/58 - 155/71)  RR: 18 (01-15-21 @ 12:31) (18 - 22)  SpO2: 95% (01-15-21 @ 14:55) (93% - 96%)  Wt(kg): --Vital Signs Last 24 Hrs  T(C): 36.1 (15 Brandon 2021 12:31), Max: 39.7 (15 Brandon 2021 05:15)  T(F): 96.9 (15 Brandon 2021 12:31), Max: 103.5 (15 Brandon 2021 05:15)  HR: 91 (15 Brandon 2021 12:31) (73 - 91)  BP: 119/73 (15 Brandon 2021 12:31) (118/58 - 155/71)  BP(mean): --  RR: 18 (15 Brandon 2021 12:31) (18 - 22)  SpO2: 95% (15 Brandon 2021 14:55) (93% - 96%)        PHYSICAL EXAM:  GENERAL: NAD  PSYCH: no agitation, baseline mentation  NERVOUS SYSTEM:  Alert & Oriented X3, no new focal deficits  PULMONARY: bilateral rhonchi, on NC  CARDIOVASCULAR: Regular rate and rhythm; No murmurs, rubs, or gallops  GI: Soft, Nontender, Nondistended; Bowel sounds present obese   no moyer  EXTREMITIES:  2+ Peripheral Pulses, No clubbing, cyanosis, or edema    Consultant(s) Notes Reviewed:  [x ] YES  [ ] NO    Discussed with Consultants/Other Providers [ x] YES     LABS                          12.2   11.14 )-----------( 159      ( 15 Brandon 2021 10:39 )             37.9     01-15    129<L>  |  92<L>  |  19  ----------------------------<  225<H>  4.1   |  26  |  0.9    Ca    9.1      15 Brandon 2021 10:39    TPro  6.5  /  Alb  3.4<L>  /  TBili  0.6  /  DBili  x   /  AST  84<H>  /  ALT  55<H>  /  AlkPhos  57  01-15          Lactate Trend        CAPILLARY BLOOD GLUCOSE            RADIOLOGY & ADDITIONAL TESTS:    Imaging Personally Reviewed:  [ ] YES  [ ] NO    HEALTH ISSUES - PROBLEM Dx:

## 2021-01-15 NOTE — PHYSICAL THERAPY INITIAL EVALUATION ADULT - GENERAL OBSERVATIONS, REHAB EVAL
8:30 - 9:18 Pt encountered semifowler in bed in NAD. + O2 at 4 lpm, + IV. Agreeable for PT. Pt c/o of pain L hip 4/10, Patt ZAVALA is aware. BP supine: 136/63, HR 82 bpm, Seated: 144/71 HR 85 bpm, Standin/63 HR 89 bpm.

## 2021-01-15 NOTE — PROGRESS NOTE ADULT - SUBJECTIVE AND OBJECTIVE BOX
Hospital Day:  1d    Chief Complaint: Patient is a 65y old  Male who presents with a chief complaint of COVID19 (15 Brandon 2021 11:15)    24 hour events: No acute overnight events. Patient seen this AM resting comfortably in bed.     Past Medical Hx:   Prostate cancer    Kidney calculi    Thalassemia minor    Anxiety    Depressed    Hypertension      Past Sx:  History of tonsillectomy    History of cardiac cath      Allergies:  No Known Allergies    Current Meds:   Standing Meds:  amLODIPine   Tablet 10 milliGRAM(s) Oral daily  ATENolol  Tablet 50 milliGRAM(s) Oral daily  cefTRIAXone   IVPB 2000 milliGRAM(s) IV Intermittent every 24 hours  dexAMETHasone  Injectable 6 milliGRAM(s) IV Push daily  enoxaparin Injectable 40 milliGRAM(s) SubCutaneous every 12 hours  lactated ringers. 1000 milliLiter(s) (75 mL/Hr) IV Continuous <Continuous>  losartan 100 milliGRAM(s) Oral daily  multivitamin 1 Tablet(s) Oral daily  pantoprazole    Tablet 40 milliGRAM(s) Oral before breakfast  PARoxetine 30 milliGRAM(s) Oral daily  remdesivir  IVPB 100 milliGRAM(s) IV Intermittent every 24 hours  remdesivir  IVPB   IV Intermittent     PRN Meds:  acetaminophen   Tablet .. 650 milliGRAM(s) Oral every 4 hours PRN Temp greater or equal to 38C (100.4F), Mild Pain (1 - 3)  senna 2 Tablet(s) Oral at bedtime PRN Constipation    HOME MEDICATIONS:  amLODIPine 10 mg oral tablet: 1 tab(s) orally once a day  atenolol 50 mg oral tablet: 1 tab(s) orally once a day  irbesartan 300 mg oral tablet: 1 tab(s) orally once a day  Paxil 40 mg oral tablet: 1 tab(s) orally once a day      Vital Signs:   T(F): 101.8 (01-15-21 @ 05:45), Max: 103.5 (01-15-21 @ 05:15)  HR: 89 (01-15-21 @ 05:15) (73 - 101)  BP: 145/65 (01-15-21 @ 05:15) (118/58 - 155/71)  RR: 20 (01-15-21 @ 05:15) (20 - 22)  SpO2: 96% (01-15-21 @ 05:15) (93% - 96%)    Physical Exam:   GENERAL: well developed male in  NAD  HEENT: NCAT, +NC   CHEST/LUNG: audible breath sounds b/l  HEART: Regular rate and rhythm; s1 s2 appreciated  ABDOMEN: Soft, Nontender, Nondistended abdomen; Bowel sounds present  EXTREMITIES: No LE edema b/l  SKIN: no rashes, no new lesions  NERVOUS SYSTEM:  Alert & Oriented X3    Labs:                         12.2   11.14 )-----------( 159      ( 15 Brandon 2021 10:39 )             37.9     Neutophil% 90.7, Lymphocyte% 4.7, Monocyte% 3.7, Bands% 0.7 01-15-21 @ 10:39    15 Brandon 2021 10:39    129    |  92     |  19     ----------------------------<  225    4.1     |  26     |  0.9      Ca    9.1        15 Brandon 2021 10:39    TPro  6.5    /  Alb  3.4    /  TBili  0.6    /  DBili  x      /  AST  84     /  ALT  55     /  AlkPhos  57     15 Brandon 2021 10:39    Iron --, TIBC --, %Sat -- Ferritin 1163 01-14-21 @ 12:00    Radiology:

## 2021-01-15 NOTE — PROGRESS NOTE ADULT - ASSESSMENT
64yo M c PMHx prostate Ca, HTN, obesity, recent fall here with sepsis poa and acute respiratory failure c hypoxia 2/2 covid 19 viral pneumonia + suspected superimposed bacterial pneumonia, hyponatremia    appreciated ID eval, agree  c/w ceftriaxone IV, empiric dexamethasone and remdesivir  f/u us duplex screen for dvt  check ua/cx +S and bcx  lovenox bid extended vte ppx  check covid abs  was able to ambulate with PT        #Progress Note Handoff    Pending (specify):  clinical improvement, c/w abx, tx as above    Family discussion: see communications notes    Disposition: ACUTE

## 2021-01-15 NOTE — PHYSICAL THERAPY INITIAL EVALUATION ADULT - GAIT TRAINING, PT EVAL
by discharge: ambulation 75 ft x 2 with RW close supervision ; stairs: 10 step ups with 1 rail contact guard

## 2021-01-15 NOTE — CONSULT NOTE ADULT - ASSESSMENT
64 yo male with PMHx of HTN, prostate ca presents with COVID19 PNA. Patient tested (+) 1/6 and has since experienced viral syndrome starting at that time with weakness, subjective fever, chills. This AM, patient fell due to weakness and had difficulty getting back up. He denies any head trauma or LOC. He denies any previous fall at home. He denies any SOB, nausea, vomiting, diarrhea.    IMPRESSION;  COVID 19 with severe illness. SpO2 < 94% on RA and need for supplemental O2.  Pt is in the late inflammatory response phase ot the illness based on the onset of symptoms. ( Dx 1/6 )  Elevation of the inflammatory markers   procalcitonin 1.41    Ferritin 1163  CRP 27.99  Ddimers 664    RECOMMENDATIONS;   Bcx  Target SpO2 92 % to 96 %  COVID-19 antibodies. If NG then plasma one unit  rocephin 2 gm iv q24h  Dexamethasone 6 mg iv q24h for 10 days ( or until discharge ) or equivalent glucocorticoid dose may be substituted. Equivalent total dosis of alternative steroids are Methylprednisolone 32 mg and prednisone 40 mg q24h.  Monitor for side effects: hyperglycemia, neurological ( agitation/confusion), adrenal suppression, bacterial and fungal infections  Day 1 : Single  mg IV loading dose  Day 2-5 : single  mg IV once daily maintenance dose  Daily CBC,CMP.  Anticoagulation as per team  Proning for 16h/day if pt can tolerate it.

## 2021-01-15 NOTE — CHART NOTE - NSCHARTNOTEFT_GEN_A_CORE
I made rounds today with the treatment team including the hospitalist, residents,  nurses and  and discussed the patient's current medical status and discharge  planning needs, and reviewed the chart.    T(C): 38.8 (01-15-21 @ 05:45), Max: 39.7 (01-15-21 @ 05:15)  HR: 89 (01-15-21 @ 05:15) (73 - 101)  BP: 145/65 (01-15-21 @ 05:15) (118/58 - 155/71)  RR: 20 (01-15-21 @ 05:15) (20 - 22)  SpO2: 96% (01-15-21 @ 05:15) (93% - 96%)          I reached out to the patient's health care proxy/ responsible family member-           [    x ]  I reached  June ( wife ) [phone]    and discussed the patient's medical condition,                   family concerns, and discharge planning           [     ]  I left a message with family               [     ]  I personally participated in rounds with the medical team and my resident and discussed the case. My resident reached                   family member/ HCP                                under my direction and supervision  and we reviewed the conversation          [     ]  My resident left a message with family under my direction and supervision                [     ]   My resident attended rounds and called the family     The following was discussed: respiratory status / vitals / CXR findings / medications / bedside PT          [     ] I spent 5-10 minutes on the above discussing medical issues with team members and family and/ or my resident    [     ] I spent 11-20 minutes on the above discussing medical issues with team members and family and/ or my resident    [   x  ] I spent 21-30 minutes on the above discussing medical issues with team members and family and/ or my resident

## 2021-01-15 NOTE — CONSULT NOTE ADULT - SUBJECTIVE AND OBJECTIVE BOX
FORTINO CHIN  65y, Male  Allergy: No Known Allergies      All historical available data reviewed.    HPI:  66 yo male with PMHx of HTN, prostate ca presents with COVID19 PNA. Patient tested (+) 1/6 and has since experienced viral syndrome starting at that time with weakness, subjective fever, chills. This AM, patient fell due to weakness and had difficulty getting back up. He denies any head trauma or LOC. He denies any previous fall at home. He denies any SOB, nausea, vomiting, diarrhea.    In ED T101, HR90, /43, RR20, SpO2 97% on 4L NC (80% on RA per EMS). COVID PCR (+), CXR c/w viral PNA. Patient admitted to medicine with working diagnosis of acute hypoxic respiratory failure 2/2 COVID19 PNA. (14 Jan 2021 14:17)    FAMILY HISTORY:    PAST MEDICAL & SURGICAL HISTORY:  Prostate cancer    Kidney calculi    Thalassemia minor    Anxiety    Depressed    Hypertension    History of tonsillectomy    History of cardiac cath          VITALS:  T(F): 101.8, Max: 103.5 (01-15-21 @ 05:15)  HR: 89  BP: 145/65  RR: 20Vital Signs Last 24 Hrs  T(C): 38.8 (15 Brandon 2021 05:45), Max: 39.7 (15 Brandon 2021 05:15)  T(F): 101.8 (15 Brandon 2021 05:45), Max: 103.5 (15 Brandon 2021 05:15)  HR: 89 (15 Brandon 2021 05:15) (73 - 101)  BP: 145/65 (15 Brandon 2021 05:15) (118/58 - 155/71)  BP(mean): --  RR: 20 (15 Brandon 2021 05:15) (20 - 22)  SpO2: 96% (15 Brandon 2021 05:15) (93% - 96%)    TESTS & MEASUREMENTS:                        12.2   11.14 )-----------( 159      ( 15 Brandon 2021 10:39 )             37.9     01-14    127<L>  |  91<L>  |  16  ----------------------------<  130<H>  4.1   |  24  |  0.9    Ca    9.1      14 Jan 2021 12:00    TPro  6.6  /  Alb  3.7  /  TBili  1.0  /  DBili  x   /  AST  57<H>  /  ALT  32  /  AlkPhos  53  01-14    LIVER FUNCTIONS - ( 14 Jan 2021 12:00 )  Alb: 3.7 g/dL / Pro: 6.6 g/dL / ALK PHOS: 53 U/L / ALT: 32 U/L / AST: 57 U/L / GGT: x                   RADIOLOGY & ADDITIONAL TESTS:  Personal review of radiological diagnostics performed  Echo and EKG results noted when applicable.     MEDICATIONS:  acetaminophen   Tablet .. 650 milliGRAM(s) Oral every 4 hours PRN  amLODIPine   Tablet 10 milliGRAM(s) Oral daily  ATENolol  Tablet 50 milliGRAM(s) Oral daily  cefTRIAXone   IVPB 1000 milliGRAM(s) IV Intermittent every 24 hours  dexAMETHasone  Injectable 6 milliGRAM(s) IV Push daily  enoxaparin Injectable 40 milliGRAM(s) SubCutaneous every 12 hours  lactated ringers. 1000 milliLiter(s) IV Continuous <Continuous>  losartan 100 milliGRAM(s) Oral daily  multivitamin 1 Tablet(s) Oral daily  pantoprazole    Tablet 40 milliGRAM(s) Oral before breakfast  PARoxetine 30 milliGRAM(s) Oral daily  remdesivir  IVPB 100 milliGRAM(s) IV Intermittent every 24 hours  remdesivir  IVPB   IV Intermittent   senna 2 Tablet(s) Oral at bedtime PRN      ANTIBIOTICS:  cefTRIAXone   IVPB 1000 milliGRAM(s) IV Intermittent every 24 hours  remdesivir  IVPB 100 milliGRAM(s) IV Intermittent every 24 hours  remdesivir  IVPB   IV Intermittent

## 2021-01-15 NOTE — PROGRESS NOTE ADULT - ASSESSMENT
64 yo male with PMHx of HTN, prostate ca presents with COVID19 PNA.    #Acute hypoxic respiratory failure  #Mechanical fall 2/2 weakness  #Severe COVID-19 PNA  - COVID PCR (+) 1/6 as OP   - Requiring 4L NC to maintain adequate saturation  - continue with dexamethasone (1/14- ) Day 2   - continue with RDV (1/14-18) Day 2   - Inflammatory markers (1/14): D-Dimer: 664 ng/mL, Ferritin, Serum: 1163 ng/mL, C-Reactive Protein, Serum: 27.99 mg/dL, Pro-remy 1.41. Repeat Inflammatory markers in 48h   - Follow up BCx and UA  - start rocephin 2g IV q24h  - Follow up COVID abs, if negative offer one unit plasma   - Appreciate ID consult   - PT/rehab    #HTN- c/w home meds    #Anxiety- c/w paroxetine    #H/o Prostate cancer  - Outpt oncology follow up      GI ppx: Pantoprazole 40mg PO Daily  DVT ppx: Lovenox 40mg SubQ  Activity: Increase as Tolerated  Diet: DASH   CODE STATUS: FULL

## 2021-01-15 NOTE — PHYSICAL THERAPY INITIAL EVALUATION ADULT - PERTINENT HX OF CURRENT PROBLEM, REHAB EVAL
pt is a 64 y/o male admitted 66 yo male with PMHx of HTN, prostate ca presents with COVID19 PNA. Patient tested (+) 1/6 and has since experienced viral syndrome starting at that time with weakness, subjective fever, chills. This AM, patient fell due to weakness and had difficulty getting back up. He denies any head trauma or LOC.

## 2021-01-15 NOTE — PHYSICAL THERAPY INITIAL EVALUATION ADULT - LIVES WITH, PROFILE
Pt lives in [facility], has 2-3 steps to enter, 1 flight to living room, then 1 flight to bedroom./spouse

## 2021-01-16 LAB
ALBUMIN SERPL ELPH-MCNC: 3.1 G/DL — LOW (ref 3.5–5.2)
ALP SERPL-CCNC: 53 U/L — SIGNIFICANT CHANGE UP (ref 30–115)
ALT FLD-CCNC: 55 U/L — HIGH (ref 0–41)
ANION GAP SERPL CALC-SCNC: 12 MMOL/L — SIGNIFICANT CHANGE UP (ref 7–14)
APPEARANCE UR: CLEAR — SIGNIFICANT CHANGE UP
AST SERPL-CCNC: 69 U/L — HIGH (ref 0–41)
BACTERIA # UR AUTO: NEGATIVE — SIGNIFICANT CHANGE UP
BASOPHILS # BLD AUTO: 0.02 K/UL — SIGNIFICANT CHANGE UP (ref 0–0.2)
BASOPHILS NFR BLD AUTO: 0.3 % — SIGNIFICANT CHANGE UP (ref 0–1)
BILIRUB SERPL-MCNC: 0.5 MG/DL — SIGNIFICANT CHANGE UP (ref 0.2–1.2)
BILIRUB UR-MCNC: NEGATIVE — SIGNIFICANT CHANGE UP
BUN SERPL-MCNC: 18 MG/DL — SIGNIFICANT CHANGE UP (ref 10–20)
CALCIUM SERPL-MCNC: 8.8 MG/DL — SIGNIFICANT CHANGE UP (ref 8.5–10.1)
CHLORIDE SERPL-SCNC: 92 MMOL/L — LOW (ref 98–110)
CO2 SERPL-SCNC: 26 MMOL/L — SIGNIFICANT CHANGE UP (ref 17–32)
COLOR SPEC: YELLOW — SIGNIFICANT CHANGE UP
CREAT SERPL-MCNC: 0.8 MG/DL — SIGNIFICANT CHANGE UP (ref 0.7–1.5)
CRP SERPL-MCNC: 22.14 MG/DL — HIGH (ref 0–0.4)
DIFF PNL FLD: ABNORMAL
EOSINOPHIL # BLD AUTO: 0 K/UL — SIGNIFICANT CHANGE UP (ref 0–0.7)
EOSINOPHIL NFR BLD AUTO: 0 % — SIGNIFICANT CHANGE UP (ref 0–8)
EPI CELLS # UR: 2 /HPF — SIGNIFICANT CHANGE UP (ref 0–5)
GLUCOSE SERPL-MCNC: 120 MG/DL — HIGH (ref 70–99)
GLUCOSE UR QL: NEGATIVE — SIGNIFICANT CHANGE UP
HCT VFR BLD CALC: 34.7 % — LOW (ref 42–52)
HGB BLD-MCNC: 11.4 G/DL — LOW (ref 14–18)
HYALINE CASTS # UR AUTO: 1 /LPF — SIGNIFICANT CHANGE UP (ref 0–7)
IMM GRANULOCYTES NFR BLD AUTO: 0.9 % — HIGH (ref 0.1–0.3)
KETONES UR-MCNC: NEGATIVE — SIGNIFICANT CHANGE UP
LEUKOCYTE ESTERASE UR-ACNC: NEGATIVE — SIGNIFICANT CHANGE UP
LYMPHOCYTES # BLD AUTO: 0.59 K/UL — LOW (ref 1.2–3.4)
LYMPHOCYTES # BLD AUTO: 8.4 % — LOW (ref 20.5–51.1)
MAGNESIUM SERPL-MCNC: 2.1 MG/DL — SIGNIFICANT CHANGE UP (ref 1.8–2.4)
MCHC RBC-ENTMCNC: 22.7 PG — LOW (ref 27–31)
MCHC RBC-ENTMCNC: 32.9 G/DL — SIGNIFICANT CHANGE UP (ref 32–37)
MCV RBC AUTO: 69.1 FL — LOW (ref 80–94)
MONOCYTES # BLD AUTO: 0.33 K/UL — SIGNIFICANT CHANGE UP (ref 0.1–0.6)
MONOCYTES NFR BLD AUTO: 4.7 % — SIGNIFICANT CHANGE UP (ref 1.7–9.3)
NEUTROPHILS # BLD AUTO: 6.03 K/UL — SIGNIFICANT CHANGE UP (ref 1.4–6.5)
NEUTROPHILS NFR BLD AUTO: 85.7 % — HIGH (ref 42.2–75.2)
NITRITE UR-MCNC: NEGATIVE — SIGNIFICANT CHANGE UP
NRBC # BLD: 0 /100 WBCS — SIGNIFICANT CHANGE UP (ref 0–0)
PH UR: 6.5 — SIGNIFICANT CHANGE UP (ref 5–8)
PLATELET # BLD AUTO: 151 K/UL — SIGNIFICANT CHANGE UP (ref 130–400)
POTASSIUM SERPL-MCNC: 4.5 MMOL/L — SIGNIFICANT CHANGE UP (ref 3.5–5)
POTASSIUM SERPL-SCNC: 4.5 MMOL/L — SIGNIFICANT CHANGE UP (ref 3.5–5)
PROCALCITONIN SERPL-MCNC: 1.08 NG/ML — HIGH (ref 0.02–0.1)
PROT SERPL-MCNC: 5.4 G/DL — LOW (ref 6–8)
PROT UR-MCNC: ABNORMAL
RBC # BLD: 5.02 M/UL — SIGNIFICANT CHANGE UP (ref 4.7–6.1)
RBC # FLD: 15.8 % — HIGH (ref 11.5–14.5)
RBC CASTS # UR COMP ASSIST: 5 /HPF — HIGH (ref 0–4)
SODIUM SERPL-SCNC: 130 MMOL/L — LOW (ref 135–146)
SP GR SPEC: 1.02 — SIGNIFICANT CHANGE UP (ref 1.01–1.03)
UROBILINOGEN FLD QL: SIGNIFICANT CHANGE UP
WBC # BLD: 7.03 K/UL — SIGNIFICANT CHANGE UP (ref 4.8–10.8)
WBC # FLD AUTO: 7.03 K/UL — SIGNIFICANT CHANGE UP (ref 4.8–10.8)
WBC UR QL: 7 /HPF — HIGH (ref 0–5)

## 2021-01-16 PROCEDURE — 99233 SBSQ HOSP IP/OBS HIGH 50: CPT | Mod: CS

## 2021-01-16 RX ADMIN — Medication 650 MILLIGRAM(S): at 21:35

## 2021-01-16 RX ADMIN — SODIUM CHLORIDE 75 MILLILITER(S): 9 INJECTION, SOLUTION INTRAVENOUS at 19:08

## 2021-01-16 RX ADMIN — Medication 650 MILLIGRAM(S): at 12:30

## 2021-01-16 RX ADMIN — Medication 650 MILLIGRAM(S): at 02:17

## 2021-01-16 RX ADMIN — LOSARTAN POTASSIUM 100 MILLIGRAM(S): 100 TABLET, FILM COATED ORAL at 04:57

## 2021-01-16 RX ADMIN — Medication 6 MILLIGRAM(S): at 04:58

## 2021-01-16 RX ADMIN — ENOXAPARIN SODIUM 40 MILLIGRAM(S): 100 INJECTION SUBCUTANEOUS at 04:58

## 2021-01-16 RX ADMIN — CEFTRIAXONE 100 MILLIGRAM(S): 500 INJECTION, POWDER, FOR SOLUTION INTRAMUSCULAR; INTRAVENOUS at 11:21

## 2021-01-16 RX ADMIN — PANTOPRAZOLE SODIUM 40 MILLIGRAM(S): 20 TABLET, DELAYED RELEASE ORAL at 04:59

## 2021-01-16 RX ADMIN — ATENOLOL 50 MILLIGRAM(S): 25 TABLET ORAL at 04:58

## 2021-01-16 RX ADMIN — ENOXAPARIN SODIUM 40 MILLIGRAM(S): 100 INJECTION SUBCUTANEOUS at 17:00

## 2021-01-16 RX ADMIN — Medication 1 TABLET(S): at 11:22

## 2021-01-16 RX ADMIN — Medication 30 MILLIGRAM(S): at 11:53

## 2021-01-16 RX ADMIN — REMDESIVIR 500 MILLIGRAM(S): 5 INJECTION INTRAVENOUS at 16:59

## 2021-01-16 RX ADMIN — AMLODIPINE BESYLATE 10 MILLIGRAM(S): 2.5 TABLET ORAL at 04:58

## 2021-01-16 NOTE — PROGRESS NOTE ADULT - ASSESSMENT
66yo M c PMHx prostate Ca, HTN, obesity, recent fall here with sepsis poa and acute respiratory failure c hypoxia 2/2 covid 19 viral pneumonia + suspected superimposed bacterial pneumonia, hyponatremia    appreciated ID eval, agree  c/w ceftriaxone IV, empiric dexamethasone and remdesivir  sepsis is ongoing-> fever control  f/u us duplex screen for dvt  check ua/cx +S and bcx  bladder scan x 1- c/o urinary hesitancy/ hx prostate ca  lovenox bid extended vte ppx  check covid abs  was able to ambulate with PT        #Progress Note Handoff    Pending (specify):  clinical improvement, c/w abx, tx as above    Family discussion: see communications notes    Disposition: ACUTE

## 2021-01-16 NOTE — PROGRESS NOTE ADULT - SUBJECTIVE AND OBJECTIVE BOX
FORTINO CHIN  65y  Male      Patient is a 65y old  Male who presents with a chief complaint of COVID19 (15 Brandon 2021 16:41)      INTERVAL HPI/OVERNIGHT EVENTS: feels similar to prior. weak coughing and sob      REVIEW OF SYSTEMS:  as above  All other review of systems negative    T(C): 39.4 (21 @ 12:58), Max: 39.4 (21 @ 12:58)  HR: 103 (21 @ 12:58) (75 - 103)  BP: 166/77 (21 @ 12:58) (123/67 - 166/)  RR: 18 (21 @ 12:58) (18 - 18)  SpO2: 96% (21 @ 12:58) (93% - 96%)  Wt(kg): --Vital Signs Last 24 Hrs  T(C): 39.4 (2021 12:58), Max: 39.4 (2021 12:58)  T(F): 102.9 (2021 12:58), Max: 102.9 (2021 12:58)  HR: 103 (2021 12:58) (75 - 103)  BP: 166/77 (2021 12:58) (123/67 - 166/)  BP(mean): --  RR: 18 (2021 12:58) (18 - 18)  SpO2: 96% (2021 12:58) (93% - 96%)        PHYSICAL EXAM:  GENERAL: NAD  PSYCH: no agitation, baseline mentation  NERVOUS SYSTEM:  Alert & Oriented X3, no new focal deficits  PULMONARY: bilateral coarse rhonchi, on 3L NC  CARDIOVASCULAR: Regular rate and rhythm; No murmurs, rubs, or gallops  GI: Soft, Nontender, Nondistended; Bowel sounds present rotund   no suprapubic tenderness no moyer  EXTREMITIES:  2+ Peripheral Pulses, No clubbing, cyanosis, or edema    Consultant(s) Notes Reviewed:  [x ] YES  [ ] NO    Discussed with Consultants/Other Providers [ x] YES     LABS                          11.4   7.03  )-----------( 151      ( 2021 07:30 )             34.7         130<L>  |  92<L>  |  18  ----------------------------<  120<H>  4.5   |  26  |  0.8    Ca    8.8      2021 07:30  Mg     2.1         TPro  5.4<L>  /  Alb  3.1<L>  /  TBili  0.5  /  DBili  x   /  AST  69<H>  /  ALT  55<H>  /  AlkPhos  53        Urinalysis Basic - ( 15 Brandon 2021 10:28 )    Color: Yellow / Appearance: Clear / S.024 / pH: x  Gluc: x / Ketone: Negative  / Bili: Negative / Urobili: <2 mg/dL   Blood: x / Protein: 100 mg/dL / Nitrite: Negative   Leuk Esterase: Negative / RBC: 5 /HPF / WBC 7 /HPF   Sq Epi: x / Non Sq Epi: 2 /HPF / Bacteria: Negative        Lactate Trend        CAPILLARY BLOOD GLUCOSE            RADIOLOGY & ADDITIONAL TESTS:    Imaging Personally Reviewed:  [ ] YES  [ ] NO    HEALTH ISSUES - PROBLEM Dx:

## 2021-01-17 LAB
ALBUMIN SERPL ELPH-MCNC: 2.9 G/DL — LOW (ref 3.5–5.2)
ALBUMIN SERPL ELPH-MCNC: 3.1 G/DL — LOW (ref 3.5–5.2)
ALP SERPL-CCNC: 56 U/L — SIGNIFICANT CHANGE UP (ref 30–115)
ALP SERPL-CCNC: 57 U/L — SIGNIFICANT CHANGE UP (ref 30–115)
ALT FLD-CCNC: 62 U/L — HIGH (ref 0–41)
ALT FLD-CCNC: 63 U/L — HIGH (ref 0–41)
ANION GAP SERPL CALC-SCNC: 12 MMOL/L — SIGNIFICANT CHANGE UP (ref 7–14)
AST SERPL-CCNC: 62 U/L — HIGH (ref 0–41)
AST SERPL-CCNC: 63 U/L — HIGH (ref 0–41)
BASOPHILS # BLD AUTO: 0.03 K/UL — SIGNIFICANT CHANGE UP (ref 0–0.2)
BASOPHILS NFR BLD AUTO: 0.5 % — SIGNIFICANT CHANGE UP (ref 0–1)
BILIRUB DIRECT SERPL-MCNC: 0.2 MG/DL — SIGNIFICANT CHANGE UP (ref 0–0.2)
BILIRUB INDIRECT FLD-MCNC: 0.3 MG/DL — SIGNIFICANT CHANGE UP (ref 0.2–1.2)
BILIRUB SERPL-MCNC: 0.5 MG/DL — SIGNIFICANT CHANGE UP (ref 0.2–1.2)
BILIRUB SERPL-MCNC: 0.5 MG/DL — SIGNIFICANT CHANGE UP (ref 0.2–1.2)
BUN SERPL-MCNC: 15 MG/DL — SIGNIFICANT CHANGE UP (ref 10–20)
CALCIUM SERPL-MCNC: 8.6 MG/DL — SIGNIFICANT CHANGE UP (ref 8.5–10.1)
CHLORIDE SERPL-SCNC: 96 MMOL/L — LOW (ref 98–110)
CO2 SERPL-SCNC: 21 MMOL/L — SIGNIFICANT CHANGE UP (ref 17–32)
CREAT SERPL-MCNC: 0.7 MG/DL — SIGNIFICANT CHANGE UP (ref 0.7–1.5)
CREAT SERPL-MCNC: 0.7 MG/DL — SIGNIFICANT CHANGE UP (ref 0.7–1.5)
CULTURE RESULTS: NO GROWTH — SIGNIFICANT CHANGE UP
EOSINOPHIL # BLD AUTO: 0.01 K/UL — SIGNIFICANT CHANGE UP (ref 0–0.7)
EOSINOPHIL NFR BLD AUTO: 0.2 % — SIGNIFICANT CHANGE UP (ref 0–8)
GLUCOSE SERPL-MCNC: 175 MG/DL — HIGH (ref 70–99)
HCT VFR BLD CALC: 36.8 % — LOW (ref 42–52)
HGB BLD-MCNC: 12.3 G/DL — LOW (ref 14–18)
IMM GRANULOCYTES NFR BLD AUTO: 1.1 % — HIGH (ref 0.1–0.3)
LYMPHOCYTES # BLD AUTO: 0.47 K/UL — LOW (ref 1.2–3.4)
LYMPHOCYTES # BLD AUTO: 7.2 % — LOW (ref 20.5–51.1)
MAGNESIUM SERPL-MCNC: 1.9 MG/DL — SIGNIFICANT CHANGE UP (ref 1.8–2.4)
MCHC RBC-ENTMCNC: 22.9 PG — LOW (ref 27–31)
MCHC RBC-ENTMCNC: 33.4 G/DL — SIGNIFICANT CHANGE UP (ref 32–37)
MCV RBC AUTO: 68.5 FL — LOW (ref 80–94)
MONOCYTES # BLD AUTO: 0.2 K/UL — SIGNIFICANT CHANGE UP (ref 0.1–0.6)
MONOCYTES NFR BLD AUTO: 3.1 % — SIGNIFICANT CHANGE UP (ref 1.7–9.3)
NEUTROPHILS # BLD AUTO: 5.72 K/UL — SIGNIFICANT CHANGE UP (ref 1.4–6.5)
NEUTROPHILS NFR BLD AUTO: 87.9 % — HIGH (ref 42.2–75.2)
NRBC # BLD: 0 /100 WBCS — SIGNIFICANT CHANGE UP (ref 0–0)
PLATELET # BLD AUTO: 163 K/UL — SIGNIFICANT CHANGE UP (ref 130–400)
POTASSIUM SERPL-MCNC: 4.2 MMOL/L — SIGNIFICANT CHANGE UP (ref 3.5–5)
POTASSIUM SERPL-SCNC: 4.2 MMOL/L — SIGNIFICANT CHANGE UP (ref 3.5–5)
PROT SERPL-MCNC: 5.6 G/DL — LOW (ref 6–8)
PROT SERPL-MCNC: 6 G/DL — SIGNIFICANT CHANGE UP (ref 6–8)
RBC # BLD: 5.37 M/UL — SIGNIFICANT CHANGE UP (ref 4.7–6.1)
RBC # FLD: 15.8 % — HIGH (ref 11.5–14.5)
SODIUM SERPL-SCNC: 129 MMOL/L — LOW (ref 135–146)
SPECIMEN SOURCE: SIGNIFICANT CHANGE UP
WBC # BLD: 6.5 K/UL — SIGNIFICANT CHANGE UP (ref 4.8–10.8)
WBC # FLD AUTO: 6.5 K/UL — SIGNIFICANT CHANGE UP (ref 4.8–10.8)

## 2021-01-17 PROCEDURE — 99233 SBSQ HOSP IP/OBS HIGH 50: CPT | Mod: CS

## 2021-01-17 RX ORDER — SODIUM CHLORIDE 9 MG/ML
1000 INJECTION INTRAMUSCULAR; INTRAVENOUS; SUBCUTANEOUS
Refills: 0 | Status: COMPLETED | OUTPATIENT
Start: 2021-01-17 | End: 2021-01-17

## 2021-01-17 RX ADMIN — PANTOPRAZOLE SODIUM 40 MILLIGRAM(S): 20 TABLET, DELAYED RELEASE ORAL at 05:36

## 2021-01-17 RX ADMIN — ENOXAPARIN SODIUM 40 MILLIGRAM(S): 100 INJECTION SUBCUTANEOUS at 05:37

## 2021-01-17 RX ADMIN — ATENOLOL 50 MILLIGRAM(S): 25 TABLET ORAL at 05:36

## 2021-01-17 RX ADMIN — Medication 6 MILLIGRAM(S): at 05:36

## 2021-01-17 RX ADMIN — AMLODIPINE BESYLATE 10 MILLIGRAM(S): 2.5 TABLET ORAL at 05:36

## 2021-01-17 RX ADMIN — CEFTRIAXONE 100 MILLIGRAM(S): 500 INJECTION, POWDER, FOR SOLUTION INTRAMUSCULAR; INTRAVENOUS at 10:35

## 2021-01-17 RX ADMIN — SODIUM CHLORIDE 75 MILLILITER(S): 9 INJECTION INTRAMUSCULAR; INTRAVENOUS; SUBCUTANEOUS at 16:00

## 2021-01-17 RX ADMIN — Medication 1 TABLET(S): at 10:35

## 2021-01-17 RX ADMIN — Medication 30 MILLIGRAM(S): at 10:34

## 2021-01-17 RX ADMIN — ENOXAPARIN SODIUM 40 MILLIGRAM(S): 100 INJECTION SUBCUTANEOUS at 17:04

## 2021-01-17 RX ADMIN — REMDESIVIR 500 MILLIGRAM(S): 5 INJECTION INTRAVENOUS at 17:04

## 2021-01-17 RX ADMIN — LOSARTAN POTASSIUM 100 MILLIGRAM(S): 100 TABLET, FILM COATED ORAL at 05:36

## 2021-01-17 NOTE — PROGRESS NOTE ADULT - SUBJECTIVE AND OBJECTIVE BOX
Hospital Day:  3d    Chief Complaint: Patient is a 65y old  Male who presents with a chief complaint of COVID19 (2021 15:34)    24 hour events: No acute overnight events. Patient seen this AM resting comfortably in bed on NC.     Past Medical Hx:   Prostate cancer    Kidney calculi    Thalassemia minor    Anxiety    Depressed    Hypertension      Past Sx:  History of tonsillectomy    History of cardiac cath      Allergies:  No Known Allergies    Current Meds:   Standing Meds:  amLODIPine   Tablet 10 milliGRAM(s) Oral daily  ATENolol  Tablet 50 milliGRAM(s) Oral daily  cefTRIAXone   IVPB 2000 milliGRAM(s) IV Intermittent every 24 hours  dexAMETHasone  Injectable 6 milliGRAM(s) IV Push daily  enoxaparin Injectable 40 milliGRAM(s) SubCutaneous every 12 hours  lactated ringers. 1000 milliLiter(s) (75 mL/Hr) IV Continuous <Continuous>  losartan 100 milliGRAM(s) Oral daily  multivitamin 1 Tablet(s) Oral daily  pantoprazole    Tablet 40 milliGRAM(s) Oral before breakfast  PARoxetine 30 milliGRAM(s) Oral daily  remdesivir  IVPB 100 milliGRAM(s) IV Intermittent every 24 hours  remdesivir  IVPB   IV Intermittent     PRN Meds:  acetaminophen   Tablet .. 650 milliGRAM(s) Oral every 4 hours PRN Temp greater or equal to 38C (100.4F), Mild Pain (1 - 3)  senna 2 Tablet(s) Oral at bedtime PRN Constipation    HOME MEDICATIONS:  amLODIPine 10 mg oral tablet: 1 tab(s) orally once a day  atenolol 50 mg oral tablet: 1 tab(s) orally once a day  irbesartan 300 mg oral tablet: 1 tab(s) orally once a day  Paxil 40 mg oral tablet: 1 tab(s) orally once a day      Vital Signs:   T(F): 98.1 (21 @ 05:00), Max: 102.9 (21 @ 12:58)  HR: 78 (21 @ 05:00) (78 - 103)  BP: 139/67 (21 @ 05:00) (139/67 - 166/77)  RR: 18 (21 @ 05:00) (18 - 18)  SpO2: 94% (21 @ 05:00) (94% - 100%)    Physical Exam:   GENERAL: well nourished male in NAD  HEENT: NCAT  CHEST/LUNG: audible breath sounds b/l  HEART: Regular rate and rhythm; s1 s2 appreciated  ABDOMEN: Soft, Nontender, Nondistended abdomen   EXTREMITIES: No LE edema b/l  NERVOUS SYSTEM:  Alert & Oriented X3    Labs:                         11.4   7.03  )-----------( 151      ( 2021 07:30 )             34.7     Neutophil% 85.7, Lymphocyte% 8.4, Monocyte% 4.7, Bands% 0.9 21 @ 07:30    2021 07:30    130    |  92     |  18     ----------------------------<  120    4.5     |  26     |  0.8      Ca    8.8        2021 07:30  Mg     2.1       2021 07:30    TPro  5.4    /  Alb  3.1    /  TBili  0.5    /  DBili  x      /  AST  69     /  ALT  55     /  AlkPhos  53     2021 07:30      Iron --, TIBC --, %Sat -- Ferritin 1163 21 @ 12:00      Urinalysis Basic - ( 15 Brandon 2021 10:28 )    Color: Yellow / Appearance: Clear / S.024 / pH: x  Gluc: x / Ketone: Negative  / Bili: Negative / Urobili: <2 mg/dL   Blood: x / Protein: 100 mg/dL / Nitrite: Negative   Leuk Esterase: Negative / RBC: 5 /HPF / WBC 7 /HPF   Sq Epi: x / Non Sq Epi: 2 /HPF / Bacteria: Negative    Culture - Blood (collected 01-15-21 @ 12:48)  Source: .Blood Blood-Peripheral  Preliminary Report (21 @ 22:01):    No growth to date.        Radiology:

## 2021-01-17 NOTE — PROGRESS NOTE ADULT - ASSESSMENT
64 yo male with PMHx of HTN, prostate ca presents with COVID19 PNA.    #Acute hypoxic respiratory failure  #Mechanical fall 2/2 weakness  #Severe COVID-19 PNA  - COVID PCR (+) 1/6 as OP   - Requiring 4L NC to maintain adequate saturation  - continue with dexamethasone (1/14- ) Day 4   - continue with RDV (1/14-18) Day 4   - Inflammatory markers (1/16): D-Dimer: 664 -> 894 ng/mL, Ferritin, Serum: 1163 ng/mL, C-Reactive Protein, Serum: 27.99 mg/dL -> 22.14, Pro-remy 1.41 ->1.08. Ferritin pending. Repeat Inflammatory markers in 48h if worsening  - Follow up BCx and UA  - continue with rocephin 2g IV q24h  - Follow up COVID abs, if negative offer one unit plasma   - Appreciate ID consult   - PT/rehab    #HTN- c/w home meds    #Anxiety- c/w paroxetine    #H/o Prostate cancer  - Outpt oncology follow up      GI ppx: Pantoprazole 40mg PO Daily  DVT ppx: Lovenox 40mg SubQ  Activity: Increase as Tolerated  Diet: DASH   CODE STATUS: FULL

## 2021-01-17 NOTE — PROGRESS NOTE ADULT - SUBJECTIVE AND OBJECTIVE BOX
FORTINO CHIN  65y  Male      Patient is a 65y old  Male who presents with a chief complaint of COVID19 (17 Jan 2021 07:25)      INTERVAL HPI/OVERNIGHT EVENTS: sob and weak      REVIEW OF SYSTEMS:  as above  All other review of systems negative    T(C): 36.3 (01-17-21 @ 14:16), Max: 39.1 (01-16-21 @ 21:41)  HR: 71 (01-17-21 @ 14:16) (71 - 94)  BP: 128/71 (01-17-21 @ 14:16) (128/71 - 145/70)  RR: 18 (01-17-21 @ 14:16) (18 - 18)  SpO2: 96% (01-17-21 @ 14:16) (94% - 100%)  Wt(kg): --Vital Signs Last 24 Hrs  T(C): 36.3 (17 Jan 2021 14:16), Max: 39.1 (16 Jan 2021 21:41)  T(F): 97.4 (17 Jan 2021 14:16), Max: 102.4 (16 Jan 2021 21:41)  HR: 71 (17 Jan 2021 14:16) (71 - 94)  BP: 128/71 (17 Jan 2021 14:16) (128/71 - 145/70)  BP(mean): --  RR: 18 (17 Jan 2021 14:16) (18 - 18)  SpO2: 96% (17 Jan 2021 14:16) (94% - 100%)        PHYSICAL EXAM:  GENERAL: deconditioned  PSYCH: no agitation, somewhat forgetful  NERVOUS SYSTEM:  Alert & Oriented X3, no new focal deficits  PULMONARY: bilateral coarse rhonchi, speaking full sentences on 3L NC  CARDIOVASCULAR: Regular rate and rhythm; No murmurs, rubs, or gallops  GI: Soft, Nontender, Nondistended; Bowel sounds present obese   no moyer  EXTREMITIES:  2+ Peripheral Pulses, No clubbing, cyanosis, or edema    Consultant(s) Notes Reviewed:  [x ] YES  [ ] NO    Discussed with Consultants/Other Providers [ x] YES     LABS                          12.3   6.50  )-----------( 163      ( 17 Jan 2021 04:30 )             36.8     01-17    x   |  x   |  x   ----------------------------<  x   x    |  x   |  0.7    Ca    8.6      17 Jan 2021 04:30  Mg     1.9     01-17    TPro  6.0  /  Alb  2.9<L>  /  TBili  0.5  /  DBili  0.2  /  AST  62<H>  /  ALT  63<H>  /  AlkPhos  56  01-17          Lactate Trend        CAPILLARY BLOOD GLUCOSE            RADIOLOGY & ADDITIONAL TESTS:    Imaging Personally Reviewed:  [ ] YES  [ ] NO    HEALTH ISSUES - PROBLEM Dx:

## 2021-01-17 NOTE — PROGRESS NOTE ADULT - ASSESSMENT
66yo M c PMHx prostate Ca, HTN, obesity, recent fall here with sepsis poa and acute respiratory failure c hypoxia 2/2 covid 19 viral pneumonia + suspected superimposed bacterial pneumonia, hyponatremia    appreciated ID eval, agree  c/w ceftriaxone IV, empiric dexamethasone and remdesivir  f/u covid antibody if negative will offer plasma  sepsis is ongoing-> fever control  f/u us duplex screen for dvt  ucx/bcx no grown to date, ua unremarkable - however this was after abx initiation  bladder scan x 1- c/o urinary hesitancy/ hx prostate ca  lovenox bid extended vte ppx  a bit more confused today  will give 1L NS gently and reevaluate sodium and mentation  was able to ambulate with PT        #Progress Note Handoff    Pending (specify):  clinical improvement, c/w abx, tx as above    Family discussion: see communications notes    Disposition: ACUTE

## 2021-01-18 LAB
ALBUMIN SERPL ELPH-MCNC: 3 G/DL — LOW (ref 3.5–5.2)
ALP SERPL-CCNC: 56 U/L — SIGNIFICANT CHANGE UP (ref 30–115)
ALT FLD-CCNC: 66 U/L — HIGH (ref 0–41)
ANION GAP SERPL CALC-SCNC: 14 MMOL/L — SIGNIFICANT CHANGE UP (ref 7–14)
AST SERPL-CCNC: 73 U/L — HIGH (ref 0–41)
BILIRUB SERPL-MCNC: 0.4 MG/DL — SIGNIFICANT CHANGE UP (ref 0.2–1.2)
BLD GP AB SCN SERPL QL: SIGNIFICANT CHANGE UP
BUN SERPL-MCNC: 16 MG/DL — SIGNIFICANT CHANGE UP (ref 10–20)
CALCIUM SERPL-MCNC: 8.7 MG/DL — SIGNIFICANT CHANGE UP (ref 8.5–10.1)
CHLORIDE SERPL-SCNC: 102 MMOL/L — SIGNIFICANT CHANGE UP (ref 98–110)
CO2 SERPL-SCNC: 22 MMOL/L — SIGNIFICANT CHANGE UP (ref 17–32)
CREAT SERPL-MCNC: 0.6 MG/DL — LOW (ref 0.7–1.5)
D DIMER BLD IA.RAPID-MCNC: 633 NG/ML DDU — HIGH (ref 0–230)
GLUCOSE SERPL-MCNC: 124 MG/DL — HIGH (ref 70–99)
MAGNESIUM SERPL-MCNC: 2.2 MG/DL — SIGNIFICANT CHANGE UP (ref 1.8–2.4)
POTASSIUM SERPL-MCNC: 5.4 MMOL/L — HIGH (ref 3.5–5)
POTASSIUM SERPL-SCNC: 5.4 MMOL/L — HIGH (ref 3.5–5)
PROT SERPL-MCNC: 6.1 G/DL — SIGNIFICANT CHANGE UP (ref 6–8)
SODIUM SERPL-SCNC: 138 MMOL/L — SIGNIFICANT CHANGE UP (ref 135–146)

## 2021-01-18 PROCEDURE — 99233 SBSQ HOSP IP/OBS HIGH 50: CPT | Mod: CS

## 2021-01-18 PROCEDURE — 71045 X-RAY EXAM CHEST 1 VIEW: CPT | Mod: 26

## 2021-01-18 RX ORDER — ALBUTEROL 90 UG/1
2 AEROSOL, METERED ORAL EVERY 6 HOURS
Refills: 0 | Status: DISCONTINUED | OUTPATIENT
Start: 2021-01-18 | End: 2021-01-20

## 2021-01-18 RX ORDER — AZITHROMYCIN 500 MG/1
500 TABLET, FILM COATED ORAL DAILY
Refills: 0 | Status: DISCONTINUED | OUTPATIENT
Start: 2021-01-18 | End: 2021-01-19

## 2021-01-18 RX ADMIN — Medication 1 TABLET(S): at 14:29

## 2021-01-18 RX ADMIN — Medication 6 MILLIGRAM(S): at 05:03

## 2021-01-18 RX ADMIN — ATENOLOL 50 MILLIGRAM(S): 25 TABLET ORAL at 05:03

## 2021-01-18 RX ADMIN — AZITHROMYCIN 500 MILLIGRAM(S): 500 TABLET, FILM COATED ORAL at 18:11

## 2021-01-18 RX ADMIN — AMLODIPINE BESYLATE 10 MILLIGRAM(S): 2.5 TABLET ORAL at 05:03

## 2021-01-18 RX ADMIN — ENOXAPARIN SODIUM 40 MILLIGRAM(S): 100 INJECTION SUBCUTANEOUS at 05:03

## 2021-01-18 RX ADMIN — CEFTRIAXONE 100 MILLIGRAM(S): 500 INJECTION, POWDER, FOR SOLUTION INTRAMUSCULAR; INTRAVENOUS at 14:27

## 2021-01-18 RX ADMIN — Medication 30 MILLIGRAM(S): at 14:28

## 2021-01-18 RX ADMIN — PANTOPRAZOLE SODIUM 40 MILLIGRAM(S): 20 TABLET, DELAYED RELEASE ORAL at 05:03

## 2021-01-18 RX ADMIN — ENOXAPARIN SODIUM 40 MILLIGRAM(S): 100 INJECTION SUBCUTANEOUS at 17:58

## 2021-01-18 RX ADMIN — LOSARTAN POTASSIUM 100 MILLIGRAM(S): 100 TABLET, FILM COATED ORAL at 05:04

## 2021-01-18 RX ADMIN — REMDESIVIR 500 MILLIGRAM(S): 5 INJECTION INTRAVENOUS at 18:11

## 2021-01-18 NOTE — PROGRESS NOTE ADULT - ASSESSMENT
66 yo male with PMHx of HTN, prostate ca presents with COVID19 PNA.    # Acute hypoxic respiratory failure  # Mechanical fall 2/2 weakness  # Severe COVID-19 PNA  - COVID PCR (+) 1/6 as OP   - Requiring 4L NC to maintain adequate saturation  - continue with dexamethasone (1/14- ) Day 5   - continue with RDV (1/14-18)  Day 5  - Inflammatory markers (1/16): D-Dimer: 664 -> 894 ng/mL, Ferritin, Serum: 1163 ng/mL, C-Reactive Protein, Serum: 27.99 mg/dL -> 22.14, Pro-remy 1.41 ->1.08. Ferritin pending. Repeat Inflammatory markers in 48h if worsening  - Follow up BCx and UA  - continue with rocephin 2g IV q24h  - Follow up COVID abs, if negative offer one unit plasma   - Appreciate ID consult   - PT/rehab    #HTN- c/w home meds    #Anxiety- c/w paroxetine    #H/o Prostate cancer  - Outpt oncology follow up      GI ppx: Pantoprazole 40mg PO Daily  DVT ppx: Lovenox 40mg SubQ  Activity: Increase as Tolerated  Diet: DASH   CODE STATUS: FULL 64 yo male with PMHx of HTN, prostate ca presents with COVID19 PNA.    # Acute hypoxic respiratory failure  # Mechanical fall 2/2 weakness  # Severe COVID-19 PNA  - COVID PCR (+) 1/6 as OP   - Requiring 4L NC to maintain adequate saturation  - continue with dexamethasone (1/14- ) Day 5   - continue with RDV (1/14-18)  Day 5  - Inflammatory markers (1/16): D-Dimer: 664 -> 894 ng/mL, Ferritin, Serum: 1163 ng/mL, C-Reactive Protein, Serum: 27.99 mg/dL -> 22.14, Pro-remy 1.41 ->1.08. Ferritin pending. Repeat Inflammatory markers in 48h if worsening. Follow up repeat markers.   - Follow up BCx and UA  - continue with rocephin 2g IV q24h for 7 days (  - COVID abs negative, will obtain T+S and offer 1 unit of plasma  - some wheezing appreciated on exam, start albuterol inhaler q6h PRN   - Follow up repeat CXR   - Appreciate ID consult   - PT/rehab    #HTN- c/w home meds    #Anxiety- c/w paroxetine    #H/o Prostate cancer  - Outpt oncology follow up    GI ppx: Pantoprazole 40mg PO Daily  DVT ppx: Lovenox 40mg SubQ  Activity: Increase as Tolerated  Diet: DASH   CODE STATUS: FULL 66 yo male with PMHx of HTN, prostate ca presents with COVID19 PNA.    # Acute hypoxic respiratory failure  # Mechanical fall 2/2 weakness  # Severe COVID-19 PNA  - COVID PCR (+) 1/6 as OP   - Requiring 4L NC to maintain adequate saturation  - continue with dexamethasone (1/14- ) Day 5   - continue with RDV (1/14-18)  Day 5  - Inflammatory markers (1/16): D-Dimer: 664 -> 894 ng/mL, Ferritin, Serum: 1163 ng/mL, C-Reactive Protein, Serum: 27.99 mg/dL -> 22.14, Pro-remy 1.41 ->1.08. Ferritin pending. Repeat Inflammatory markers in 48h if worsening. Follow up repeat markers.   - Follow up BCx and UA  - continue with rocephin 2g IV q24h for 7 days (  - COVID abs negative, will obtain T+S and offer 1 unit of plasma  - some wheezing appreciated on exam, start albuterol inhaler q6h PRN   - Follow up repeat CXR   - Appreciate ID consult   - PT/rehab    #HTN- c/w home meds    #Anxiety- c/w paroxetine    #H/o Prostate cancer  - Outpt oncology follow up    GI ppx: Pantoprazole 40mg PO Daily  DVT ppx: Lovenox 40mg SubQ  Activity: Increase as Tolerated  Diet: DASH   Dispo: possible [facility]   CODE STATUS: FULL 64 yo male with PMHx of HTN, prostate ca presents with COVID19 PNA.    # Acute hypoxic respiratory failure  # Mechanical fall 2/2 weakness  # Severe COVID-19 PNA  - COVID PCR (+) 1/6 as OP   - Requiring 4L NC to maintain adequate saturation  - continue with dexamethasone (1/14- ) Day 5   - continue with RDV (1/14-18)  Day 5  - Inflammatory markers (1/16): D-Dimer: 664 -> 894 ng/mL, Ferritin, Serum: 1163 ng/mL, C-Reactive Protein, Serum: 27.99 mg/dL -> 22.14, Pro-remy 1.41 ->1.08. Ferritin pending. Repeat Inflammatory markers in 48h if worsening. Follow up repeat markers.   - Follow up BCx and UA  - continue with rocephin 2g IV q24h for 7 days (  - Follow up COVID abs, will obtain T+S and offer 1 unit of plasma if negative   - some wheezing appreciated on exam, start albuterol inhaler q6h PRN   - Follow up repeat CXR   - Appreciate ID consult   - PT/rehab    #HTN- c/w home meds    #Anxiety- c/w paroxetine    #H/o Prostate cancer  - Outpt oncology follow up    GI ppx: Pantoprazole 40mg PO Daily  DVT ppx: Lovenox 40mg SubQ  Activity: Increase as Tolerated  Diet: DASH   Dispo: possible [facility]   CODE STATUS: FULL

## 2021-01-18 NOTE — PROGRESS NOTE ADULT - SUBJECTIVE AND OBJECTIVE BOX
FORTINO CHIN  65y, Male    All available historical data reviewed    OVERNIGHT EVENTS:  no fevers  feels well and has no complaints  94% NC 1 LIT    ROS:  General: Denies rigors, nightsweats  HEENT: Denies headache, rhinorrhea, sore throat, eye pain  CV: Denies CP, palpitations  PULM: Denies wheezing, hemoptysis  GI: Denies hematemesis, hematochezia, melena  : Denies discharge, hematuria  MSK: Denies arthralgias, myalgias  SKIN: Denies rash, lesions  NEURO: Denies paresthesias, weakness  PSYCH: Denies depression, anxiety    VITALS:  T(F): 96.3, Max: 97.4 (01-17-21 @ 14:16)  HR: 66  BP: 129/70  RR: 18Vital Signs Last 24 Hrs  T(C): 35.7 (18 Jan 2021 05:00), Max: 36.3 (17 Jan 2021 14:16)  T(F): 96.3 (18 Jan 2021 05:00), Max: 97.4 (17 Jan 2021 14:16)  HR: 66 (18 Jan 2021 05:00) (66 - 76)  BP: 129/70 (18 Jan 2021 05:00) (128/71 - 133/60)  BP(mean): --  RR: 18 (18 Jan 2021 05:00) (18 - 19)  SpO2: 93% (18 Jan 2021 05:00) (93% - 98%)    TESTS & MEASUREMENTS:                        12.3   6.50  )-----------( 163      ( 17 Jan 2021 04:30 )             36.8     01-18    138  |  102  |  16  ----------------------------<  124<H>  5.4<H>   |  22  |  0.6<L>    Ca    8.7      18 Jan 2021 08:51  Mg     2.2     01-18    TPro  6.1  /  Alb  3.0<L>  /  TBili  0.4  /  DBili  x   /  AST  73<H>  /  ALT  66<H>  /  AlkPhos  56  01-18    LIVER FUNCTIONS - ( 18 Jan 2021 08:51 )  Alb: 3.0 g/dL / Pro: 6.1 g/dL / ALK PHOS: 56 U/L / ALT: 66 U/L / AST: 73 U/L / GGT: x             Culture - Blood (collected 01-15-21 @ 12:48)  Source: .Blood Blood-Peripheral  Preliminary Report (01-16-21 @ 22:01):    No growth to date.    Culture - Urine (collected 01-15-21 @ 10:28)  Source: .Urine Clean Catch (Midstream)  Final Report (01-17-21 @ 14:18):    No growth            RADIOLOGY & ADDITIONAL TESTS:  Personal review of radiological diagnostics performed  Echo and EKG results noted when applicable.     MEDICATIONS:  acetaminophen   Tablet .. 650 milliGRAM(s) Oral every 4 hours PRN  ALBUTerol    90 MICROgram(s) HFA Inhaler 2 Puff(s) Inhalation every 6 hours PRN  amLODIPine   Tablet 10 milliGRAM(s) Oral daily  ATENolol  Tablet 50 milliGRAM(s) Oral daily  cefTRIAXone   IVPB 2000 milliGRAM(s) IV Intermittent every 24 hours  dexAMETHasone  Injectable 6 milliGRAM(s) IV Push daily  enoxaparin Injectable 40 milliGRAM(s) SubCutaneous every 12 hours  losartan 100 milliGRAM(s) Oral daily  multivitamin 1 Tablet(s) Oral daily  pantoprazole    Tablet 40 milliGRAM(s) Oral before breakfast  PARoxetine 30 milliGRAM(s) Oral daily  remdesivir  IVPB 100 milliGRAM(s) IV Intermittent every 24 hours  remdesivir  IVPB   IV Intermittent   senna 2 Tablet(s) Oral at bedtime PRN      ANTIBIOTICS:  cefTRIAXone   IVPB 2000 milliGRAM(s) IV Intermittent every 24 hours  remdesivir  IVPB 100 milliGRAM(s) IV Intermittent every 24 hours  remdesivir  IVPB   IV Intermittent

## 2021-01-18 NOTE — PROGRESS NOTE ADULT - ASSESSMENT
66yo M c PMHx prostate Ca, HTN, obesity, recent fall here with sepsis poa and acute respiratory failure c hypoxia 2/2 covid 19 viral pneumonia + suspected superimposed bacterial pneumonia, hyponatremia    appreciated ID eval, agree  c/w ceftriaxone IV-> transition to po azithromycin per id recommendation, empiric dexamethasone and remdesivir  will complete po prednisone as out patient  f/u covid antibody if negative will offer plasma-> patient agreeable, likely later today if ab's result as negative  sepsis improving, was afebrile today  us duplex negative for dvt  ucx/bcx no grown to date  lovenox bid extended vte ppx (improve VTE score 2)  no longer confused today  s/p gentle ivh, repeat metabolic panel hemolyzed-> will repeat tomorrow but patient no longer appears dry  was able to ambulate with PT-> stairs session tomorrow/ discussed this personally with the physical therapist today        #Progress Note Handoff    Pending (specify):  possible plasma once ab's are negative, PT stairs session tomorrow, repeat bmp in the am. possible discharge tomorrow    Family discussion: see communications notes    Disposition: improving   64yo M c PMHx prostate Ca, HTN, obesity, recent fall here with sepsis poa and acute respiratory failure c hypoxia 2/2 covid 19 viral pneumonia + suspected superimposed bacterial pneumonia, hyponatremia    appreciated ID eval, agree  c/w ceftriaxone IV-> transition to po azithromycin per id recommendation, empiric dexamethasone and remdesivir  will complete po prednisone as out patient  f/u covid antibody if negative will offer plasma-> patient agreeable, likely later today if ab's result as negative  sepsis improving, was afebrile today  us duplex negative for dvt  ucx/bcx no grown to date  lovenox bid extended vte ppx (improve VTE score 2)> aspirin 81mg po qd x 30 days will be reasonable upon discharge  no longer confused today  s/p gentle ivh, repeat metabolic panel hemolyzed-> will repeat tomorrow but patient no longer appears dry  was able to ambulate with PT-> stairs session tomorrow/ discussed this personally with the physical therapist today        #Progress Note Handoff    Pending (specify):  possible plasma once ab's are negative, PT stairs session tomorrow, repeat bmp in the am. possible discharge tomorrow    Family discussion: see communications notes    Disposition: improving

## 2021-01-18 NOTE — PROGRESS NOTE ADULT - SUBJECTIVE AND OBJECTIVE BOX
FORTINO CHIN  65y  Male      Patient is a 65y old  Male who presents with a chief complaint of COVID19 (18 Jan 2021 10:00)      INTERVAL HPI/OVERNIGHT EVENTS: less sob, improved appetite. participated a bit with PT today. agreeable to plasma      REVIEW OF SYSTEMS:  as above  All other review of systems negative    T(C): 35.6 (01-18-21 @ 12:26), Max: 36.3 (01-17-21 @ 21:18)  HR: 77 (01-18-21 @ 12:26) (66 - 77)  BP: 128/71 (01-18-21 @ 12:26) (128/71 - 133/60)  RR: 18 (01-18-21 @ 12:26) (18 - 19)  SpO2: 94% (01-18-21 @ 12:26) (93% - 98%)  Wt(kg): --Vital Signs Last 24 Hrs  T(C): 35.6 (18 Jan 2021 12:26), Max: 36.3 (17 Jan 2021 21:18)  T(F): 96.1 (18 Jan 2021 12:26), Max: 97.4 (17 Jan 2021 21:18)  HR: 77 (18 Jan 2021 12:26) (66 - 77)  BP: 128/71 (18 Jan 2021 12:26) (128/71 - 133/60)  BP(mean): --  RR: 18 (18 Jan 2021 12:26) (18 - 19)  SpO2: 94% (18 Jan 2021 12:26) (93% - 98%)      01-17-21 @ 07:01  -  01-18-21 @ 07:00  --------------------------------------------------------  IN: 0 mL / OUT: 950 mL / NET: -950 mL        PHYSICAL EXAM:  GENERAL: deconditioned but looking not toxic today  PSYCH: no agitation, baseline mentation  NERVOUS SYSTEM:  Alert & Oriented X3, no new focal deficits  PULMONARY: subtle improvement in bilateral coarse rhonchi, comfortable on RA while at rest  CARDIOVASCULAR: Regular rate and rhythm; No murmurs, rubs, or gallops  GI: Soft, Nontender, Nondistended; Bowel sounds present rotund  EXTREMITIES:  2+ Peripheral Pulses, No clubbing, cyanosis, + scant pedal edema    Consultant(s) Notes Reviewed:  [x ] YES  [ ] NO    Discussed with Consultants/Other Providers [ x] YES     LABS                          12.3   6.50  )-----------( 163      ( 17 Jan 2021 04:30 )             36.8     01-18    138  |  102  |  16  ----------------------------<  124<H>  5.4<H>   |  22  |  0.6<L>    Ca    8.7      18 Jan 2021 08:51  Mg     2.2     01-18    TPro  6.1  /  Alb  3.0<L>  /  TBili  0.4  /  DBili  x   /  AST  73<H>  /  ALT  66<H>  /  AlkPhos  56  01-18          Lactate Trend        CAPILLARY BLOOD GLUCOSE            RADIOLOGY & ADDITIONAL TESTS:    Imaging Personally Reviewed:  [ ] YES  [ ] NO    HEALTH ISSUES - PROBLEM Dx:

## 2021-01-18 NOTE — PROGRESS NOTE ADULT - ASSESSMENT
· Assessment	  66 yo male with PMHx of HTN, prostate ca presents with COVID19 PNA. Patient tested (+) 1/6 and has since experienced viral syndrome starting at that time with weakness, subjective fever, chills. This AM, patient fell due to weakness and had difficulty getting back up. He denies any head trauma or LOC. He denies any previous fall at home. He denies any SOB, nausea, vomiting, diarrhea.    IMPRESSION;  Clinically stable and has no complaints  COVID 19 with severe illness. SpO2 < 94% on RA and need for supplemental O2.94% NC 1 LIT  Pt is in the late inflammatory response phase ot the illness based on the onset of symptoms. ( Dx 1/6 )  Elevation of the inflammatory markers   procalcitonin 1.41 >1.08  Ferritin 1163  CRP 27.99>22.1  Ddimers 664>633  Blood & Urine cxs NGTD 1/15    s/p RDV 1/14-18    RECOMMENDATIONS;   Target SpO2 92 % to 96 %  COVID-19 antibodies. If NG then plasma one unit  rocephin 2 gm iv q24h. Could change to po Azithromycin 500 mg q24h for 4 more days  Dexamethasone 6 mg iv q24h for 10 days   Could finish course of steroids with po prednisone 40 mg q24 for a total of 10 days   Anticoagulation as per team  Could transfer to [facility] for further management.   recall prn please

## 2021-01-18 NOTE — PROGRESS NOTE ADULT - SUBJECTIVE AND OBJECTIVE BOX
Hospital Day:  4d    Chief Complaint: Patient is a 65y old  Male who presents with a chief complaint of COVID19 (2021 15:23)    24 hour events: No acute overnight events.     Past Medical Hx:   Prostate cancer    Kidney calculi    Thalassemia minor    Anxiety    Depressed    Hypertension      Past Sx:  History of tonsillectomy    History of cardiac cath      Allergies:  No Known Allergies    Current Meds:   Standing Meds:  amLODIPine   Tablet 10 milliGRAM(s) Oral daily  ATENolol  Tablet 50 milliGRAM(s) Oral daily  cefTRIAXone   IVPB 2000 milliGRAM(s) IV Intermittent every 24 hours  dexAMETHasone  Injectable 6 milliGRAM(s) IV Push daily  enoxaparin Injectable 40 milliGRAM(s) SubCutaneous every 12 hours  losartan 100 milliGRAM(s) Oral daily  multivitamin 1 Tablet(s) Oral daily  pantoprazole    Tablet 40 milliGRAM(s) Oral before breakfast  PARoxetine 30 milliGRAM(s) Oral daily  remdesivir  IVPB 100 milliGRAM(s) IV Intermittent every 24 hours  remdesivir  IVPB   IV Intermittent     PRN Meds:  acetaminophen   Tablet .. 650 milliGRAM(s) Oral every 4 hours PRN Temp greater or equal to 38C (100.4F), Mild Pain (1 - 3)  senna 2 Tablet(s) Oral at bedtime PRN Constipation    HOME MEDICATIONS:  amLODIPine 10 mg oral tablet: 1 tab(s) orally once a day  atenolol 50 mg oral tablet: 1 tab(s) orally once a day  irbesartan 300 mg oral tablet: 1 tab(s) orally once a day  Paxil 40 mg oral tablet: 1 tab(s) orally once a day      Vital Signs:   T(F): 96.3 (21 @ 05:00), Max: 97.4 (21 @ 14:16)  HR: 66 (21 @ 05:00) (66 - 76)  BP: 129/70 (21 @ 05:00) (128/71 - 133/60)  RR: 18 (21 @ 05:00) (18 - 19)  SpO2: 93% (21 @ 05:00) (93% - 98%)      21 @ 07:01  -  21 @ 06:40  --------------------------------------------------------  IN: 0 mL / OUT: 950 mL / NET: -950 mL    Physical Exam:   GENERAL: NAD  HEENT: NCAT  CHEST/LUNG: CTAB  HEART: Regular rate and rhythm; s1 s2 appreciated, No murmurs, rubs, or gallops  ABDOMEN: Soft, Nontender, Nondistended; Bowel sounds present  EXTREMITIES: No LE edema b/l  SKIN: no rashes, no new lesions  NERVOUS SYSTEM:  Alert & Oriented X3  LINES/CATHETERS:    Labs:                         12.3   6.50  )-----------( 163      ( 2021 04:30 )             36.8       2021 07:00    x      |  x      |  x      ----------------------------<  x      x       |  x      |  0.7      Ca    8.6        2021 04:30  Mg     1.9       2021 04:30    TPro  6.0    /  Alb  2.9    /  TBili  0.5    /  DBili  0.2    /  AST  62     /  ALT  63     /  AlkPhos  56     2021 07:00    Urinalysis Basic - ( 15 [address] 2021 10:28 )    Color: Yellow / Appearance: Clear / S.024 / pH: x  Gluc: x / Ketone: Negative  / Bili: Negative / Urobili: <2 mg/dL   Blood: x / Protein: 100 mg/dL / Nitrite: Negative   Leuk Esterase: Negative / RBC: 5 /HPF / WBC 7 /HPF   Sq Epi: x / Non Sq Epi: 2 /HPF / Bacteria: Negative    Culture - Blood (collected 01-15-21 @ 12:48)  Source: .Blood Blood-Peripheral  Preliminary Report (21 @ 22:01):    No growth to date.        Radiology:    Hospital Day:  4d    Chief Complaint: Patient is a 65y old  Male who presents with a chief complaint of COVID19 (2021 15:23)    24 hour events: No acute overnight events. Patient seen this AM resting comfortably in on 3L NC.    Past Medical Hx:   Prostate cancer    Kidney calculi    Thalassemia minor    Anxiety    Depressed    Hypertension      Past Sx:  History of tonsillectomy    History of cardiac cath      Allergies:  No Known Allergies    Current Meds:   Standing Meds:  amLODIPine   Tablet 10 milliGRAM(s) Oral daily  ATENolol  Tablet 50 milliGRAM(s) Oral daily  cefTRIAXone   IVPB 2000 milliGRAM(s) IV Intermittent every 24 hours  dexAMETHasone  Injectable 6 milliGRAM(s) IV Push daily  enoxaparin Injectable 40 milliGRAM(s) SubCutaneous every 12 hours  losartan 100 milliGRAM(s) Oral daily  multivitamin 1 Tablet(s) Oral daily  pantoprazole    Tablet 40 milliGRAM(s) Oral before breakfast  PARoxetine 30 milliGRAM(s) Oral daily  remdesivir  IVPB 100 milliGRAM(s) IV Intermittent every 24 hours  remdesivir  IVPB   IV Intermittent     PRN Meds:  acetaminophen   Tablet .. 650 milliGRAM(s) Oral every 4 hours PRN Temp greater or equal to 38C (100.4F), Mild Pain (1 - 3)  senna 2 Tablet(s) Oral at bedtime PRN Constipation    HOME MEDICATIONS:  amLODIPine 10 mg oral tablet: 1 tab(s) orally once a day  atenolol 50 mg oral tablet: 1 tab(s) orally once a day  irbesartan 300 mg oral tablet: 1 tab(s) orally once a day  Paxil 40 mg oral tablet: 1 tab(s) orally once a day      Vital Signs:   T(F): 96.3 (21 @ 05:00), Max: 97.4 (21 @ 14:16)  HR: 66 (21 @ 05:00) (66 - 76)  BP: 129/70 (21 @ 05:00) (128/71 - 133/60)  RR: 18 (21 @ 05:00) (18 - 19)  SpO2: 93% (21 @ 05:00) (93% - 98%)      21 @ 07:01  -  21 @ 06:40  --------------------------------------------------------  IN: 0 mL / OUT: 950 mL / NET: -950 mL    Physical Exam:   GENERAL: well developed male in NAD  HEENT: NCAT, NC   CHEST/LUNG: audible breath sounds bilaterally, mild expiratory wheeze appreciated.   HEART: Regular rate and rhythm; s1 s2 appreciated  ABDOMEN: Soft, Nontender, Nondistended; Bowel sounds present  EXTREMITIES: No LE edema b/l  NERVOUS SYSTEM:  Alert & Oriented X3    Labs:                         12.3   6.50  )-----------( 163      ( 2021 04:30 )             36.8       2021 07:00    x      |  x      |  x      ----------------------------<  x      x       |  x      |  0.7      Ca    8.6        2021 04:30  Mg     1.9       2021 04:30    TPro  6.0    /  Alb  2.9    /  TBili  0.5    /  DBili  0.2    /  AST  62     /  ALT  63     /  AlkPhos  56     2021 07:00    Urinalysis Basic - ( 15 [address] 2021 10:28 )    Color: Yellow / Appearance: Clear / S.024 / pH: x  Gluc: x / Ketone: Negative  / Bili: Negative / Urobili: <2 mg/dL   Blood: x / Protein: 100 mg/dL / Nitrite: Negative   Leuk Esterase: Negative / RBC: 5 /HPF / WBC 7 /HPF   Sq Epi: x / Non Sq Epi: 2 /HPF / Bacteria: Negative    Culture - Blood (collected 01-15-21 @ 12:48)  Source: .Blood Blood-Peripheral  Preliminary Report (21 @ 22:01):    No growth to date.        Radiology:

## 2021-01-19 LAB
ALBUMIN SERPL ELPH-MCNC: 3.3 G/DL — LOW (ref 3.5–5.2)
ALBUMIN SERPL ELPH-MCNC: 3.5 G/DL — SIGNIFICANT CHANGE UP (ref 3.5–5.2)
ALP SERPL-CCNC: 61 U/L — SIGNIFICANT CHANGE UP (ref 30–115)
ALP SERPL-CCNC: 62 U/L — SIGNIFICANT CHANGE UP (ref 30–115)
ALT FLD-CCNC: 106 U/L — HIGH (ref 0–41)
ALT FLD-CCNC: 108 U/L — HIGH (ref 0–41)
ANION GAP SERPL CALC-SCNC: 13 MMOL/L — SIGNIFICANT CHANGE UP (ref 7–14)
AST SERPL-CCNC: 84 U/L — HIGH (ref 0–41)
AST SERPL-CCNC: 86 U/L — HIGH (ref 0–41)
BASOPHILS # BLD AUTO: 0.04 K/UL — SIGNIFICANT CHANGE UP (ref 0–0.2)
BASOPHILS NFR BLD AUTO: 0.4 % — SIGNIFICANT CHANGE UP (ref 0–1)
BILIRUB DIRECT SERPL-MCNC: 0.2 MG/DL — SIGNIFICANT CHANGE UP (ref 0–0.2)
BILIRUB INDIRECT FLD-MCNC: 0.3 MG/DL — SIGNIFICANT CHANGE UP (ref 0.2–1.2)
BILIRUB SERPL-MCNC: 0.5 MG/DL — SIGNIFICANT CHANGE UP (ref 0.2–1.2)
BILIRUB SERPL-MCNC: 0.5 MG/DL — SIGNIFICANT CHANGE UP (ref 0.2–1.2)
BUN SERPL-MCNC: 17 MG/DL — SIGNIFICANT CHANGE UP (ref 10–20)
CALCIUM SERPL-MCNC: 9 MG/DL — SIGNIFICANT CHANGE UP (ref 8.5–10.1)
CHLORIDE SERPL-SCNC: 101 MMOL/L — SIGNIFICANT CHANGE UP (ref 98–110)
CO2 SERPL-SCNC: 24 MMOL/L — SIGNIFICANT CHANGE UP (ref 17–32)
CREAT SERPL-MCNC: 0.6 MG/DL — LOW (ref 0.7–1.5)
CREAT SERPL-MCNC: 0.7 MG/DL — SIGNIFICANT CHANGE UP (ref 0.7–1.5)
CRP SERPL-MCNC: 8.04 MG/DL — HIGH (ref 0–0.4)
EOSINOPHIL # BLD AUTO: 0.03 K/UL — SIGNIFICANT CHANGE UP (ref 0–0.7)
EOSINOPHIL NFR BLD AUTO: 0.3 % — SIGNIFICANT CHANGE UP (ref 0–8)
FERRITIN SERPL-MCNC: 4271 NG/ML — HIGH (ref 30–400)
GLUCOSE SERPL-MCNC: 160 MG/DL — HIGH (ref 70–99)
HCT VFR BLD CALC: 37.9 % — LOW (ref 42–52)
HGB BLD-MCNC: 12.4 G/DL — LOW (ref 14–18)
IMM GRANULOCYTES NFR BLD AUTO: 1.3 % — HIGH (ref 0.1–0.3)
LYMPHOCYTES # BLD AUTO: 1.23 K/UL — SIGNIFICANT CHANGE UP (ref 1.2–3.4)
LYMPHOCYTES # BLD AUTO: 13 % — LOW (ref 20.5–51.1)
MAGNESIUM SERPL-MCNC: 2.2 MG/DL — SIGNIFICANT CHANGE UP (ref 1.8–2.4)
MCHC RBC-ENTMCNC: 23 PG — LOW (ref 27–31)
MCHC RBC-ENTMCNC: 32.7 G/DL — SIGNIFICANT CHANGE UP (ref 32–37)
MCV RBC AUTO: 70.4 FL — LOW (ref 80–94)
MONOCYTES # BLD AUTO: 0.34 K/UL — SIGNIFICANT CHANGE UP (ref 0.1–0.6)
MONOCYTES NFR BLD AUTO: 3.6 % — SIGNIFICANT CHANGE UP (ref 1.7–9.3)
NEUTROPHILS # BLD AUTO: 7.7 K/UL — HIGH (ref 1.4–6.5)
NEUTROPHILS NFR BLD AUTO: 81.4 % — HIGH (ref 42.2–75.2)
NRBC # BLD: 0 /100 WBCS — SIGNIFICANT CHANGE UP (ref 0–0)
PLATELET # BLD AUTO: 207 K/UL — SIGNIFICANT CHANGE UP (ref 130–400)
POTASSIUM SERPL-MCNC: 4.6 MMOL/L — SIGNIFICANT CHANGE UP (ref 3.5–5)
POTASSIUM SERPL-SCNC: 4.6 MMOL/L — SIGNIFICANT CHANGE UP (ref 3.5–5)
PROCALCITONIN SERPL-MCNC: 0.36 NG/ML — HIGH (ref 0.02–0.1)
PROT SERPL-MCNC: 6.3 G/DL — SIGNIFICANT CHANGE UP (ref 6–8)
PROT SERPL-MCNC: 6.4 G/DL — SIGNIFICANT CHANGE UP (ref 6–8)
RBC # BLD: 5.38 M/UL — SIGNIFICANT CHANGE UP (ref 4.7–6.1)
RBC # FLD: 16.8 % — HIGH (ref 11.5–14.5)
SARS-COV-2 IGG SERPL IA-ACNC: 0.9 RATIO — HIGH
SARS-COV-2 IGG SERPL IA-ACNC: 4.3 RATIO — HIGH
SARS-COV-2 IGG SERPL IA-ACNC: <0.3 RATIO — SIGNIFICANT CHANGE UP
SARS-COV-2 IGG SERPL QL IA: ABNORMAL
SARS-COV-2 IGG SERPL QL IA: NEGATIVE — SIGNIFICANT CHANGE UP
SARS-COV-2 IGG SERPL QL IA: NEGATIVE — SIGNIFICANT CHANGE UP
SARS-COV-2 IGG SERPL QL IA: POSITIVE
SARS-COV-2 IGM SERPL IA-ACNC: 0.38 RATIO — SIGNIFICANT CHANGE UP
SARS-COV-2 IGM SERPL IA-ACNC: 1.12 RATIO — HIGH
SARS-COV-2 IGM SERPL IA-ACNC: 2.34 RATIO — HIGH
SODIUM SERPL-SCNC: 138 MMOL/L — SIGNIFICANT CHANGE UP (ref 135–146)
WBC # BLD: 9.46 K/UL — SIGNIFICANT CHANGE UP (ref 4.8–10.8)
WBC # FLD AUTO: 9.46 K/UL — SIGNIFICANT CHANGE UP (ref 4.8–10.8)

## 2021-01-19 PROCEDURE — 70450 CT HEAD/BRAIN W/O DYE: CPT | Mod: 26

## 2021-01-19 PROCEDURE — 99233 SBSQ HOSP IP/OBS HIGH 50: CPT | Mod: CS

## 2021-01-19 RX ORDER — CEFTRIAXONE 500 MG/1
2000 INJECTION, POWDER, FOR SOLUTION INTRAMUSCULAR; INTRAVENOUS EVERY 24 HOURS
Refills: 0 | Status: DISCONTINUED | OUTPATIENT
Start: 2021-01-19 | End: 2021-01-20

## 2021-01-19 RX ADMIN — Medication 30 MILLIGRAM(S): at 11:18

## 2021-01-19 RX ADMIN — ATENOLOL 50 MILLIGRAM(S): 25 TABLET ORAL at 05:17

## 2021-01-19 RX ADMIN — ENOXAPARIN SODIUM 40 MILLIGRAM(S): 100 INJECTION SUBCUTANEOUS at 05:18

## 2021-01-19 RX ADMIN — Medication 1 TABLET(S): at 11:18

## 2021-01-19 RX ADMIN — CEFTRIAXONE 100 MILLIGRAM(S): 500 INJECTION, POWDER, FOR SOLUTION INTRAMUSCULAR; INTRAVENOUS at 11:17

## 2021-01-19 RX ADMIN — PANTOPRAZOLE SODIUM 40 MILLIGRAM(S): 20 TABLET, DELAYED RELEASE ORAL at 05:17

## 2021-01-19 RX ADMIN — LOSARTAN POTASSIUM 100 MILLIGRAM(S): 100 TABLET, FILM COATED ORAL at 05:17

## 2021-01-19 RX ADMIN — Medication 6 MILLIGRAM(S): at 05:17

## 2021-01-19 RX ADMIN — AMLODIPINE BESYLATE 10 MILLIGRAM(S): 2.5 TABLET ORAL at 05:17

## 2021-01-19 RX ADMIN — ENOXAPARIN SODIUM 40 MILLIGRAM(S): 100 INJECTION SUBCUTANEOUS at 17:48

## 2021-01-19 NOTE — PROGRESS NOTE ADULT - ASSESSMENT
66 yo male with PMHx of HTN, prostate ca presents with COVID19 PNA.    # Acute hypoxic respiratory failure  # Mechanical fall 2/2 weakness  # Severe COVID-19 PNA  - COVID PCR (+) 1/6 as OP   - Requiring 4L NC to maintain adequate saturation  - continue with dexamethasone (1/14- ) Day 6   - s/p course of RDV (1/14-18)   - Inflammatory markers (1/16): D-Dimer: 664 -> 894 ng/mL, Ferritin, Serum: 1163 -> 4271 ng/mL, C-Reactive Protein, Serum: 27.99 mg/dL -> 22.14, Pro-remy 1.41 ->1.08. Repeat Inflammatory markers in 48h if worsening.  - UA was negative, blood cultures show NGTD.   - Patient switched to azithromycin PO yesterday, however due to possible delirium, resume rocephin 2g IV q24h- Follow up COVID abs, will obtain T+S and offer 1 unit of plasma if negative   - some wheezing appreciated on exam, continue with albuterol inhaler q6h PRN   - repeat CXR showed unchanged patchy bilateral parenchymal opacities   - Appreciate ID consult   - PT/rehab    # Confusion likely secondary to delirium?   - nonfocal neuro exam   - CTH ordered   - patient to be reoriented as possible.    #HTN- c/w home meds    #Anxiety- c/w paroxetine    #H/o Prostate cancer  - Outpt oncology follow up    GI ppx: Pantoprazole 40mg PO Daily  DVT ppx: Lovenox 40mg SubQ  Activity: Increase as Tolerated  Diet: DASH   Dispo: From home   CODE STATUS: FULL

## 2021-01-19 NOTE — PROGRESS NOTE ADULT - ASSESSMENT
66yo M c PMHx prostate Ca, HTN, obesity, recent fall here with sepsis poa and acute respiratory failure c hypoxia 2/2 covid 19 viral pneumonia + suspected superimposed bacterial pneumonia, hyponatremia now improved, case complicated by acute metabolic encephalopathy/ delirium    appreciated ID eval, agree  completed empiric remdesivir, continue iv dexamethasone  mental status changed after we changed ceftriaxone to po azithromycin- in light of this we resumed ceftriaxone again  upon discharge can deescalate to po vantin instead of the azithromycin to complete a total of 7 days of antibiotic  reorient for confusion- suspect this is hospital induced delirium, cannot pin point a specific trigger- though patient citing some disorientation from abnormal sleep wake cycle in the hospital  I opened the window shade to allow light into the room, and pointed out the white board with the day and date circled  CTH today was negative for CVA  f/u covid antibody if negative will offer plasma-> patient agreeable, likely later today if ab's result as negative  sepsis otherwise improving, patient was not febrile. some possible hypothermic episodes were noted, check rectal temperatures instead  us duplex negative for dvt  ucx/bcx no grown to date  lovenox bid extended vte ppx (improve VTE score 2)> aspirin 81mg po qd x 30 days will be reasonable upon discharge  patient did well with PT and did stairs training today, has repeat session planned for tomorrow  check ambulatory o2 in the am, and follow up mentation        #Progress Note Handoff    Pending (specify): f/u mentation in the am    Family discussion: see communications notes    Disposition: possibly home tomorrow if mentation stable, and not hypoxic (vs will set up with home o2 for ambulation)

## 2021-01-19 NOTE — CHART NOTE - NSCHARTNOTEFT_GEN_A_CORE
I made rounds today with the treatment team including the hospitalist, residents,  nurses and  and discussed the patient's current medical status and discharge  planning needs, and reviewed the chart.    T(C): 35.6 (01-19-21 @ 05:00), Max: 35.6 (01-18-21 @ 12:26)  HR: 70 (01-19-21 @ 05:00) (67 - 77)  BP: 144/72 (01-19-21 @ 05:00) (128/71 - 144/72)  RR: 20 (01-19-21 @ 05:00) (18 - 20)  SpO2: 96% (01-19-21 @ 05:00) (94% - 96%)          I reached out to the patient's health care proxy/ responsible family member-           [  x   ]  I reached   June ( spouse ) [phone]      and discussed the patient's medical condition,                   family concerns, and discharge planning           [     ]  I left a message with family               [     ]  I personally participated in rounds with the medical team and my resident and discussed the case. My resident reached                   family member/ HCP                                under my direction and supervision  and we reviewed the conversation          [     ]  My resident left a message with family under my direction and supervision                [     ]   My resident attended rounds and called the family     The following was discussed: vitals / respiratory status / medications / mental status / CT head pending          [     ] I spent 5-10 minutes on the above discussing medical issues with team members and family and/ or my resident    [     ] I spent 11-20 minutes on the above discussing medical issues with team members and family and/ or my resident    [   x  ] I spent 21-30 minutes on the above discussing medical issues with team members and family and/ or my resident

## 2021-01-19 NOTE — PROGRESS NOTE ADULT - SUBJECTIVE AND OBJECTIVE BOX
FORTINO CHIN  65y  Male      Patient is a 65y old  Male who presents with a chief complaint of COVID19 (19 Jan 2021 09:04)      INTERVAL HPI/OVERNIGHT EVENTS: was very confused overnight and this morning. during my evaluation patient was disoriented to place, believed he may have been in the [facility] office in [facility]. no focal complaints or headache. breathing is more comfortable      REVIEW OF SYSTEMS:  as above  All other review of systems negative    T(C): 35.8 (01-19-21 @ 14:32), Max: 35.8 (01-19-21 @ 14:32)  HR: 71 (01-19-21 @ 14:32) (67 - 71)  BP: 126/70 (01-19-21 @ 14:32) (126/70 - 144/72)  RR: 18 (01-19-21 @ 14:32) (18 - 20)  SpO2: 94% (01-19-21 @ 14:32) (94% - 96%)  Wt(kg): --Vital Signs Last 24 Hrs  T(C): 35.8 (19 Jan 2021 14:32), Max: 35.8 (19 Jan 2021 14:32)  T(F): 96.5 (19 Jan 2021 14:32), Max: 96.5 (19 Jan 2021 14:32)  HR: 71 (19 Jan 2021 14:32) (67 - 71)  BP: 126/70 (19 Jan 2021 14:32) (126/70 - 144/72)  BP(mean): --  RR: 18 (19 Jan 2021 14:32) (18 - 20)  SpO2: 94% (19 Jan 2021 14:32) (94% - 96%)      01-19-21 @ 07:01  -  01-19-21 @ 16:27  --------------------------------------------------------  IN: 0 mL / OUT: 1500 mL / NET: -1500 mL        PHYSICAL EXAM:  GENERAL: NAD  PSYCH: no agitation, baseline mentation  NERVOUS SYSTEM:  Alert & Oriented X2 (not to place), no new focal deficits, str b/l 5/5, sensory intact b/l, no drift, eomi  PULMONARY: improved bilateral rhonchi, placed on room air  CARDIOVASCULAR: Regular rate and rhythm; No murmurs, rubs, or gallops  GI: Soft, Nontender, Nondistended; Bowel sounds present rotund  EXTREMITIES:  2+ Peripheral Pulses, No clubbing, cyanosis, or edema    Consultant(s) Notes Reviewed:  [x ] YES  [ ] NO    Discussed with Consultants/Other Providers [ x] YES     LABS                          12.4   9.46  )-----------( 207      ( 19 Jan 2021 04:30 )             37.9     01-19    x   |  x   |  x   ----------------------------<  x   x    |  x   |  0.7    Ca    9.0      19 Jan 2021 04:30  Mg     2.2     01-19    TPro  6.3  /  Alb  3.3<L>  /  TBili  0.5  /  DBili  0.2  /  AST  84<H>  /  ALT  106<H>  /  AlkPhos  62  01-19          Lactate Trend        CAPILLARY BLOOD GLUCOSE            RADIOLOGY & ADDITIONAL TESTS:    Imaging Personally Reviewed:  [ ] YES  [ ] NO    HEALTH ISSUES - PROBLEM Dx:

## 2021-01-19 NOTE — PROGRESS NOTE ADULT - SUBJECTIVE AND OBJECTIVE BOX
Hospital Day:  5d    Chief Complaint: Patient is a 65y old  Male who presents with a chief complaint of COVID19 (2021 15:55)    24 hour events: No acute overnight events. Patient seen this AM resting comfortably in bed.     Past Medical Hx:   Prostate cancer    Kidney calculi    Thalassemia minor    Anxiety    Depressed    Hypertension      Past Sx:  History of tonsillectomy    History of cardiac cath      Allergies:  No Known Allergies    Current Meds:   Standing Meds:  amLODIPine   Tablet 10 milliGRAM(s) Oral daily  ATENolol  Tablet 50 milliGRAM(s) Oral daily  cefTRIAXone   IVPB 2000 milliGRAM(s) IV Intermittent every 24 hours  dexAMETHasone  Injectable 6 milliGRAM(s) IV Push daily  enoxaparin Injectable 40 milliGRAM(s) SubCutaneous every 12 hours  losartan 100 milliGRAM(s) Oral daily  multivitamin 1 Tablet(s) Oral daily  pantoprazole    Tablet 40 milliGRAM(s) Oral before breakfast  PARoxetine 30 milliGRAM(s) Oral daily    PRN Meds:  acetaminophen   Tablet .. 650 milliGRAM(s) Oral every 4 hours PRN Temp greater or equal to 38C (100.4F), Mild Pain (1 - 3)  ALBUTerol    90 MICROgram(s) HFA Inhaler 2 Puff(s) Inhalation every 6 hours PRN Shortness of Breath and/or Wheezing  senna 2 Tablet(s) Oral at bedtime PRN Constipation    HOME MEDICATIONS:  amLODIPine 10 mg oral tablet: 1 tab(s) orally once a day  atenolol 50 mg oral tablet: 1 tab(s) orally once a day  irbesartan 300 mg oral tablet: 1 tab(s) orally once a day  Paxil 40 mg oral tablet: 1 tab(s) orally once a day      Vital Signs:   T(F): 96 (21 @ 05:00), Max: 96.1 (21 @ 12:26)  HR: 70 (21 @ 05:00) (67 - 77)  BP: 144/72 (21 @ 05:00) (128/71 - 144/72)  RR: 20 (21 @ 05:00) (18 - 20)  SpO2: 96% (21 @ 05:00) (94% - 96%)      21 @ 07:01  -  01-19-21 @ 09:04  --------------------------------------------------------  IN: 0 mL / OUT: 900 mL / NET: -900 mL    Physical Exam:   GENERAL: well developed male in NAD  HEENT: NCAT  CHEST/LUNG: audible breath sounds bilaterally   HEART: Regular rate and rhythm; s1 s2 appreciated  ABDOMEN: Soft, Nontender, Nondistended; Bowel sounds present  EXTREMITIES: No LE edema b/l  SKIN: no rashes, no new lesions  NERVOUS SYSTEM:  Alert & Oriented X4, confused?    Labs:       2021 08:51    138    |  102    |  16     ----------------------------<  124    5.4     |  22     |  0.6      Ca    8.7        2021 08:51  Mg     2.2       2021 08:51    TPro  6.1    /  Alb  3.0    /  TBili  0.4    /  DBili  x      /  AST  73     /  ALT  66     /  AlkPhos  56     2021 08:51    Iron --, TIBC --, %Sat -- Ferritin 4271 21 @ 11:00      Urinalysis Basic - ( 15 Brandon 2021 10:28 )    Color: Yellow / Appearance: Clear / S.024 / pH: x  Gluc: x / Ketone: Negative  / Bili: Negative / Urobili: <2 mg/dL   Blood: x / Protein: 100 mg/dL / Nitrite: Negative   Leuk Esterase: Negative / RBC: 5 /HPF / WBC 7 /HPF   Sq Epi: x / Non Sq Epi: 2 /HPF / Bacteria: Negative          Culture - Blood (collected 01-15-21 @ 12:48)  Source: .Blood Blood-Peripheral  Preliminary Report (21 @ 22:01):    No growth to date.        Radiology:

## 2021-01-20 ENCOUNTER — TRANSCRIPTION ENCOUNTER (OUTPATIENT)
Age: 66
End: 2021-01-20

## 2021-01-20 VITALS
HEART RATE: 72 BPM | SYSTOLIC BLOOD PRESSURE: 133 MMHG | OXYGEN SATURATION: 96 % | DIASTOLIC BLOOD PRESSURE: 72 MMHG | TEMPERATURE: 97 F | RESPIRATION RATE: 18 BRPM

## 2021-01-20 LAB
ALBUMIN SERPL ELPH-MCNC: 3.3 G/DL — LOW (ref 3.5–5.2)
ALBUMIN SERPL ELPH-MCNC: 3.3 G/DL — LOW (ref 3.5–5.2)
ALP SERPL-CCNC: 59 U/L — SIGNIFICANT CHANGE UP (ref 30–115)
ALP SERPL-CCNC: 60 U/L — SIGNIFICANT CHANGE UP (ref 30–115)
ALT FLD-CCNC: 128 U/L — HIGH (ref 0–41)
ALT FLD-CCNC: 129 U/L — HIGH (ref 0–41)
ANION GAP SERPL CALC-SCNC: 7 MMOL/L — SIGNIFICANT CHANGE UP (ref 7–14)
AST SERPL-CCNC: 78 U/L — HIGH (ref 0–41)
AST SERPL-CCNC: 83 U/L — HIGH (ref 0–41)
BASOPHILS # BLD AUTO: 0.05 K/UL — SIGNIFICANT CHANGE UP (ref 0–0.2)
BASOPHILS NFR BLD AUTO: 0.6 % — SIGNIFICANT CHANGE UP (ref 0–1)
BILIRUB DIRECT SERPL-MCNC: 0.2 MG/DL — SIGNIFICANT CHANGE UP (ref 0–0.2)
BILIRUB INDIRECT FLD-MCNC: 0.3 MG/DL — SIGNIFICANT CHANGE UP (ref 0.2–1.2)
BILIRUB SERPL-MCNC: 0.5 MG/DL — SIGNIFICANT CHANGE UP (ref 0.2–1.2)
BILIRUB SERPL-MCNC: 0.8 MG/DL — SIGNIFICANT CHANGE UP (ref 0.2–1.2)
BUN SERPL-MCNC: 19 MG/DL — SIGNIFICANT CHANGE UP (ref 10–20)
CALCIUM SERPL-MCNC: 8.9 MG/DL — SIGNIFICANT CHANGE UP (ref 8.5–10.1)
CHLORIDE SERPL-SCNC: 100 MMOL/L — SIGNIFICANT CHANGE UP (ref 98–110)
CO2 SERPL-SCNC: 29 MMOL/L — SIGNIFICANT CHANGE UP (ref 17–32)
CREAT SERPL-MCNC: 0.7 MG/DL — SIGNIFICANT CHANGE UP (ref 0.7–1.5)
CREAT SERPL-MCNC: 0.7 MG/DL — SIGNIFICANT CHANGE UP (ref 0.7–1.5)
CULTURE RESULTS: SIGNIFICANT CHANGE UP
EOSINOPHIL # BLD AUTO: 0.17 K/UL — SIGNIFICANT CHANGE UP (ref 0–0.7)
EOSINOPHIL NFR BLD AUTO: 2 % — SIGNIFICANT CHANGE UP (ref 0–8)
GLUCOSE SERPL-MCNC: 110 MG/DL — HIGH (ref 70–99)
HCT VFR BLD CALC: 37.5 % — LOW (ref 42–52)
HGB BLD-MCNC: 12 G/DL — LOW (ref 14–18)
IMM GRANULOCYTES NFR BLD AUTO: 1.9 % — HIGH (ref 0.1–0.3)
LYMPHOCYTES # BLD AUTO: 1.71 K/UL — SIGNIFICANT CHANGE UP (ref 1.2–3.4)
LYMPHOCYTES # BLD AUTO: 20 % — LOW (ref 20.5–51.1)
MAGNESIUM SERPL-MCNC: 2.1 MG/DL — SIGNIFICANT CHANGE UP (ref 1.8–2.4)
MCHC RBC-ENTMCNC: 22.8 PG — LOW (ref 27–31)
MCHC RBC-ENTMCNC: 32 G/DL — SIGNIFICANT CHANGE UP (ref 32–37)
MCV RBC AUTO: 71.2 FL — LOW (ref 80–94)
MONOCYTES # BLD AUTO: 0.69 K/UL — HIGH (ref 0.1–0.6)
MONOCYTES NFR BLD AUTO: 8.1 % — SIGNIFICANT CHANGE UP (ref 1.7–9.3)
NEUTROPHILS # BLD AUTO: 5.77 K/UL — SIGNIFICANT CHANGE UP (ref 1.4–6.5)
NEUTROPHILS NFR BLD AUTO: 67.4 % — SIGNIFICANT CHANGE UP (ref 42.2–75.2)
NRBC # BLD: 0 /100 WBCS — SIGNIFICANT CHANGE UP (ref 0–0)
PLATELET # BLD AUTO: 260 K/UL — SIGNIFICANT CHANGE UP (ref 130–400)
POTASSIUM SERPL-MCNC: 4.9 MMOL/L — SIGNIFICANT CHANGE UP (ref 3.5–5)
POTASSIUM SERPL-SCNC: 4.9 MMOL/L — SIGNIFICANT CHANGE UP (ref 3.5–5)
PROT SERPL-MCNC: 6.1 G/DL — SIGNIFICANT CHANGE UP (ref 6–8)
PROT SERPL-MCNC: 6.2 G/DL — SIGNIFICANT CHANGE UP (ref 6–8)
RBC # BLD: 5.27 M/UL — SIGNIFICANT CHANGE UP (ref 4.7–6.1)
RBC # FLD: 16.7 % — HIGH (ref 11.5–14.5)
SODIUM SERPL-SCNC: 136 MMOL/L — SIGNIFICANT CHANGE UP (ref 135–146)
SPECIMEN SOURCE: SIGNIFICANT CHANGE UP
WBC # BLD: 8.55 K/UL — SIGNIFICANT CHANGE UP (ref 4.8–10.8)
WBC # FLD AUTO: 8.55 K/UL — SIGNIFICANT CHANGE UP (ref 4.8–10.8)

## 2021-01-20 PROCEDURE — 99233 SBSQ HOSP IP/OBS HIGH 50: CPT | Mod: CS

## 2021-01-20 RX ORDER — RIVAROXABAN 15 MG-20MG
1 KIT ORAL
Qty: 30 | Refills: 0
Start: 2021-01-20 | End: 2021-02-18

## 2021-01-20 RX ORDER — ALBUTEROL 90 UG/1
2 AEROSOL, METERED ORAL
Qty: 240 | Refills: 0
Start: 2021-01-20 | End: 2021-02-18

## 2021-01-20 RX ADMIN — Medication 1 TABLET(S): at 11:09

## 2021-01-20 RX ADMIN — ATENOLOL 50 MILLIGRAM(S): 25 TABLET ORAL at 06:09

## 2021-01-20 RX ADMIN — LOSARTAN POTASSIUM 100 MILLIGRAM(S): 100 TABLET, FILM COATED ORAL at 06:09

## 2021-01-20 RX ADMIN — AMLODIPINE BESYLATE 10 MILLIGRAM(S): 2.5 TABLET ORAL at 06:09

## 2021-01-20 RX ADMIN — ENOXAPARIN SODIUM 40 MILLIGRAM(S): 100 INJECTION SUBCUTANEOUS at 17:03

## 2021-01-20 RX ADMIN — ENOXAPARIN SODIUM 40 MILLIGRAM(S): 100 INJECTION SUBCUTANEOUS at 06:10

## 2021-01-20 RX ADMIN — Medication 6 MILLIGRAM(S): at 06:09

## 2021-01-20 RX ADMIN — CEFTRIAXONE 100 MILLIGRAM(S): 500 INJECTION, POWDER, FOR SOLUTION INTRAMUSCULAR; INTRAVENOUS at 08:43

## 2021-01-20 RX ADMIN — Medication 30 MILLIGRAM(S): at 11:09

## 2021-01-20 RX ADMIN — PANTOPRAZOLE SODIUM 40 MILLIGRAM(S): 20 TABLET, DELAYED RELEASE ORAL at 06:09

## 2021-01-20 NOTE — DISCHARGE NOTE PROVIDER - CARE PROVIDER_API CALL
Taisha Melendez  INTERNAL MEDICINE  [address] [place] [id number]  Phone: [phone]  Fax: [phone]  Follow Up Time: 1 week   Taisha Melendez  INTERNAL MEDICINE  [address] [place] [address]  Phone: [phone]  Fax: [phone]  Follow Up Time: 1 week    Flavia Felix)  Geriatric Medicine; Internal Medicine  [address], [place] [id number]  Phone: [phone]  Fax: [phone]  Follow Up Time: 1 week

## 2021-01-20 NOTE — DISCHARGE NOTE PROVIDER - CARE PROVIDERS DIRECT ADDRESSES
[email or web address]

## 2021-01-20 NOTE — CHART NOTE - NSCHARTNOTEFT_GEN_A_CORE
I made rounds today with the treatment team including the hospitalist, residents,  nurses and  and discussed the patient's current medical status and discharge  planning needs, and reviewed the chart.    T(C): 35.6 (01-20-21 @ 05:32), Max: 36.7 (01-19-21 @ 21:23)  HR: 77 (01-20-21 @ 05:32) (71 - 77)  BP: 132/60 (01-20-21 @ 05:32) (126/70 - 159/83)  RR: 18 (01-20-21 @ 05:32) (18 - 18)  SpO2: 95% (01-20-21 @ 05:32) (94% - 96%)          I reached out to the patient's health care proxy/ responsible family member-           [  x   ]  I reached   June ( wife ) [phone]   and discussed the patient's medical condition,                   family concerns, and discharge planning           [     ]  I left a message with family               [     ]  I personally participated in rounds with the medical team and my resident and discussed the case. My resident reached                   family member/ HCP                                under my direction and supervision  and we reviewed the conversation          [     ]  My resident left a message with family under my direction and supervision                [     ]   My resident attended rounds and called the family     The following was discussed: vitals / respiratory status / medications / CT head results          [     ] I spent 5-10 minutes on the above discussing medical issues with team members and family and/ or my resident    [     ] I spent 11-20 minutes on the above discussing medical issues with team members and family and/ or my resident    [  x   ] I spent 21-30 minutes on the above discussing medical issues with team members and family and/ or my resident

## 2021-01-20 NOTE — PROGRESS NOTE ADULT - ASSESSMENT
64 yo male with PMHx of HTN, prostate ca presents with COVID19 PNA.    # Acute hypoxic respiratory failure >> resolving, Spo2 96% on RA.      Will check amb. Pulse Ox.   # Mechanical fall 2/2 weakness  # Severe COVID-19 PNA  - COVID PCR (+) 1/6 as OP   - Requiring 4L NC to maintain adequate saturation ON ADMISSION.   - continue with dexamethasone (1/14- ) Day 7   - s/p course of RDV (1/14-18)   - Inflammatory markers (1/16): D-Dimer: 664 -> 894 ng/mL, Ferritin, Serum: 1163 -> 4271 ng/mL, C-Reactive Protein, Serum: 27.99 mg/dL -> 22.14, Pro-remy 1.41 ->1.08. Repeat Inflammatory markers in 48h if worsening.  - UA was negative, blood cultures show NGTD.   - continue with rocephin 2g IV q24h Day 7  - continue with albuterol inhaler q6h PRN   - repeat CXR showed unchanged patchy bilateral parenchymal opacities     # Confusion likely secondary to hospital delirium? - resolved   - nonfocal neuro exam   - CTH negative for acute process   - patient successfully reoriented.     #HTN- c/w home meds    #Anxiety- c/w paroxetine    #H/o Prostate cancer  - Outpt oncology follow up    GI ppx: Pantoprazole 40mg PO Daily  DVT ppx: Lovenox 40mg SubQ  Activity: Increase as Tolerated  Diet: DASH   Dispo: From home   CODE STATUS: FULL    D/C PLANNING .

## 2021-01-20 NOTE — PROGRESS NOTE ADULT - SUBJECTIVE AND OBJECTIVE BOX
FORTINO CHIN  65y  Male      Patient is a 65y old  Male who presents with a chief complaint of COVID19.      INTERVAL HPI/OVERNIGHT EVENTS: The patient was seen at bedside.       ******************************* REVIEW OF SYSTEMS:**********************************************    All other review of systems negative    *********************** VITALS ******************************************    T(F): 96.1 (01-20-21 @ 05:32)  HR: 77 (01-20-21 @ 05:32) (71 - 77)  BP: 132/60 (01-20-21 @ 05:32) (126/70 - 159/83)  RR: 18 (01-20-21 @ 05:32) (18 - 18)  SpO2: 95% (01-20-21 @ 05:32) (94% - 96%)    01-19-21 @ 07:01  -  01-20-21 @ 07:00  --------------------------------------------------------  IN: 0 mL / OUT: 1500 mL / NET: -1500 mL            01-19-21 @ 07:01  -  01-20-21 @ 07:00  --------------------------------------------------------  IN: 0 mL / OUT: 1500 mL / NET: -1500 mL        ******************************** PHYSICAL EXAM:**************************************************  GENERAL: NAD    PSYCH: no agitation, baseline mentation  HEENT:     NERVOUS SYSTEM:  Alert & Oriented X3,    PULMONARY: SAMUEL, CTA    CARDIOVASCULAR: S1S2 RRR    GI: Soft, NT, ND; BS present.    EXTREMITIES:  2+ Peripheral Pulses, No clubbing, cyanosis, or edema    LYMPH: No lymphadenopathy noted    SKIN: No rashes or lesions      **************************** LABS *******************************************************                          12.0   8.55  )-----------( 260      ( 20 Jan 2021 07:34 )             37.5     01-20    136  |  100  |  19  ----------------------------<  110<H>  4.9   |  29  |  0.7    Ca    8.9      20 Jan 2021 07:34  Mg     2.1     01-20    TPro  6.2  /  Alb  3.3<L>  /  TBili  0.8  /  DBili  0.2  /  AST  83<H>  /  ALT  129<H>  /  AlkPhos  59  01-20          Lactate Trend        CAPILLARY BLOOD GLUCOSE              **************************Active Medications *******************************************  No Known Allergies      acetaminophen   Tablet .. 650 milliGRAM(s) Oral every 4 hours PRN  ALBUTerol    90 MICROgram(s) HFA Inhaler 2 Puff(s) Inhalation every 6 hours PRN  amLODIPine   Tablet 10 milliGRAM(s) Oral daily  ATENolol  Tablet 50 milliGRAM(s) Oral daily  cefTRIAXone   IVPB 2000 milliGRAM(s) IV Intermittent every 24 hours  dexAMETHasone  Injectable 6 milliGRAM(s) IV Push daily  enoxaparin Injectable 40 milliGRAM(s) SubCutaneous every 12 hours  losartan 100 milliGRAM(s) Oral daily  multivitamin 1 Tablet(s) Oral daily  pantoprazole    Tablet 40 milliGRAM(s) Oral before breakfast  PARoxetine 30 milliGRAM(s) Oral daily  senna 2 Tablet(s) Oral at bedtime PRN      ***************************************************  RADIOLOGY & ADDITIONAL TESTS:    Imaging Personally Reviewed:  [ ] YES  [ ] NO    HEALTH ISSUES - PROBLEM Dx:

## 2021-01-20 NOTE — PROGRESS NOTE ADULT - SUBJECTIVE AND OBJECTIVE BOX
Hospital Day:  6d    Chief Complaint: Patient is a 65y old  Male who presents with a chief complaint of COVID19 (19 Jan 2021 16:27)    24 hour events: No acute overnight events. Patient seen this AM resting comfortably in bed on RA.     Past Medical Hx:   Prostate cancer    Kidney calculi    Thalassemia minor    Anxiety    Depressed    Hypertension      Past Sx:  History of tonsillectomy    History of cardiac cath      Allergies:  No Known Allergies    Current Meds:   Standing Meds:  amLODIPine   Tablet 10 milliGRAM(s) Oral daily  ATENolol  Tablet 50 milliGRAM(s) Oral daily  cefTRIAXone   IVPB 2000 milliGRAM(s) IV Intermittent every 24 hours  dexAMETHasone  Injectable 6 milliGRAM(s) IV Push daily  enoxaparin Injectable 40 milliGRAM(s) SubCutaneous every 12 hours  losartan 100 milliGRAM(s) Oral daily  multivitamin 1 Tablet(s) Oral daily  pantoprazole    Tablet 40 milliGRAM(s) Oral before breakfast  PARoxetine 30 milliGRAM(s) Oral daily    PRN Meds:  acetaminophen   Tablet .. 650 milliGRAM(s) Oral every 4 hours PRN Temp greater or equal to 38C (100.4F), Mild Pain (1 - 3)  ALBUTerol    90 MICROgram(s) HFA Inhaler 2 Puff(s) Inhalation every 6 hours PRN Shortness of Breath and/or Wheezing  senna 2 Tablet(s) Oral at bedtime PRN Constipation    HOME MEDICATIONS:  amLODIPine 10 mg oral tablet: 1 tab(s) orally once a day  atenolol 50 mg oral tablet: 1 tab(s) orally once a day  irbesartan 300 mg oral tablet: 1 tab(s) orally once a day  Paxil 40 mg oral tablet: 1 tab(s) orally once a day      Vital Signs:   T(F): 96.1 (01-20-21 @ 05:32), Max: 98 (01-19-21 @ 21:23)  HR: 77 (01-20-21 @ 05:32) (71 - 77)  BP: 132/60 (01-20-21 @ 05:32) (126/70 - 159/83)  RR: 18 (01-20-21 @ 05:32) (18 - 18)  SpO2: 95% (01-20-21 @ 05:32) (94% - 96%)      01-19-21 @ 07:01  -  01-20-21 @ 07:00  --------------------------------------------------------  IN: 0 mL / OUT: 1500 mL / NET: -1500 mL    Physical Exam:   GENERAL: well nourished male in NAD  HEENT: NCAT  CHEST/LUNG: audible breath sounds bilaterally  HEART: Regular rate and rhythm; s1 s2 appreciated  ABDOMEN: Soft, Nontender, Nondistended; Bowel sounds present  EXTREMITIES: No LE edema b/l  SKIN: no rashes, no new lesions  NERVOUS SYSTEM:  Alert & Oriented X3    Labs:                         12.0   8.55  )-----------( 260      ( 20 Jan 2021 07:34 )             37.5     Neutophil% 67.4, Lymphocyte% 20.0, Monocyte% 8.1, Bands% 1.9 01-20-21 @ 07:34    20 Jan 2021 07:34    136    |  100    |  19     ----------------------------<  110    4.9     |  29     |  0.7      Ca    8.9        20 Jan 2021 07:34  Mg     2.1       20 Jan 2021 07:34    TPro  6.2    /  Alb  3.3    /  TBili  0.8    /  DBili  0.2    /  AST  83     /  ALT  129    /  AlkPhos  59     20 Jan 2021 07:34      Iron --, TIBC --, %Sat -- Ferritin 4271 01-18-21 @ 11:00    Culture - Blood (collected 01-15-21 @ 12:48)  Source: .Blood Blood-Peripheral  Preliminary Report (01-16-21 @ 22:01):    No growth to date.        Radiology:

## 2021-01-20 NOTE — DISCHARGE NOTE NURSING/CASE MANAGEMENT/SOCIAL WORK - PATIENT PORTAL LINK FT
You can access the FollowMyHealth Patient Portal offered by [facility] by registering at the following website: [email or web address]. By joining [employer]’s FollowMyHealth portal, you will also be able to view your health information using other applications (apps) compatible with our system.

## 2021-01-20 NOTE — DISCHARGE NOTE PROVIDER - HOSPITAL COURSE
64 yo male with PMHx of HTN, prostate ca presents with COVID19 PNA. Patient tested (+) 1/6 and has since experienced viral syndrome starting at that time with weakness, subjective fever, chills. This AM, patient fell due to weakness and had difficulty getting back up. He denies any head trauma or LOC. He denies any previous fall at home. He denies any SOB, nausea, vomiting, diarrhea.    In ED T101, HR90, /43, RR20, SpO2 97% on 4L NC (80% on RA per EMS). COVID PCR (+), CXR c/w viral PNA. Patient admitted to medicine with working diagnosis of acute hypoxic respiratory failure 2/2 COVID19 PNA. The patient was requiring 4L NC on admission to maintain adequate saturation. He completed a 7 day course of rocephin, a course of RDV, and 7 days of dexamethasone. The patient is currently on room air and saturating well on ambulation as well. LE Duplex was negative on this admission. Hospital course was complicated by an episode of confusion most likely to be hospital aquired delirium, CTH was negative for an acute process, and ultimately the patient was reoriented successfully. Currently the patient is stable and amenable to discharge home.

## 2021-01-20 NOTE — DISCHARGE NOTE PROVIDER - PROVIDER TOKENS
PROVIDER:[TOKEN:[[id number]],FOLLOWUP:[1 week]] PROVIDER:[TOKEN:[[id number]],FOLLOWUP:[1 week]],PROVIDER:[TOKEN:[[id number]],FOLLOWUP:[1 week]]

## 2021-01-20 NOTE — PROGRESS NOTE ADULT - PROVIDER SPECIALTY LIST ADULT
Hospitalist
Hospitalist
Internal Medicine
Internal Medicine
Hospitalist
Hospitalist
Internal Medicine
Internal Medicine
Hospitalist
Internal Medicine
Hospitalist
Infectious Disease

## 2021-01-20 NOTE — DISCHARGE NOTE PROVIDER - NSDCMRMEDTOKEN_GEN_ALL_CORE_FT
amLODIPine 10 mg oral tablet: 1 tab(s) orally once a day  atenolol 50 mg oral tablet: 1 tab(s) orally once a day  irbesartan 300 mg oral tablet: 1 tab(s) orally once a day  Paxil 40 mg oral tablet: 1 tab(s) orally once a day   Albuterol (Eqv-ProAir HFA) 90 mcg/inh inhalation aerosol: 2 puff(s) inhaled every 6 hours, As needed, Shortness of Breath and/or Wheezing  amLODIPine 10 mg oral tablet: 1 tab(s) orally once a day  atenolol 50 mg oral tablet: 1 tab(s) orally once a day  irbesartan 300 mg oral tablet: 1 tab(s) orally once a day  Paxil 40 mg oral tablet: 1 tab(s) orally once a day  predniSONE 20 mg oral tablet: 2 tab(s) orally once a day   Xarelto 10 mg oral tablet: 1 tab(s) orally once a day

## 2021-01-20 NOTE — PROGRESS NOTE ADULT - ASSESSMENT
64 yo male with PMHx of HTN, prostate ca presents with COVID19 PNA.    # Acute hypoxic respiratory failure  # Mechanical fall 2/2 weakness  # Severe COVID-19 PNA  - COVID PCR (+) 1/6 as OP   - Requiring 4L NC to maintain adequate saturation  - continue with dexamethasone (1/14- ) Day 7   - s/p course of RDV (1/14-18)   - Inflammatory markers (1/16): D-Dimer: 664 -> 894 ng/mL, Ferritin, Serum: 1163 -> 4271 ng/mL, C-Reactive Protein, Serum: 27.99 mg/dL -> 22.14, Pro-remy 1.41 ->1.08. Repeat Inflammatory markers in 48h if worsening.  - UA was negative, blood cultures show NGTD.   - Patient switched to azithromycin PO yesterday, however due to possible delirium, resume rocephin 2g IV q24h.  - COVID Ab positive.   - continue with albuterol inhaler q6h PRN   - repeat CXR showed unchanged patchy bilateral parenchymal opacities   - Appreciate ID consult   - dispo planning     # Confusion likely secondary to delirium? - resolved   - nonfocal neuro exam   - CTH negative for acute process   - patient successfully reoriented.     #HTN- c/w home meds    #Anxiety- c/w paroxetine    #H/o Prostate cancer  - Outpt oncology follow up    GI ppx: Pantoprazole 40mg PO Daily  DVT ppx: Lovenox 40mg SubQ  Activity: Increase as Tolerated  Diet: DASH   Dispo: From home   CODE STATUS: FULL 66 yo male with PMHx of HTN, prostate ca presents with COVID19 PNA.    # Acute hypoxic respiratory failure  # Mechanical fall 2/2 weakness  # Severe COVID-19 PNA  - COVID PCR (+) 1/6 as OP   - Requiring 4L NC to maintain adequate saturation  - continue with dexamethasone (1/14- ) Day 7   - s/p course of RDV (1/14-18)   - Inflammatory markers (1/16): D-Dimer: 664 -> 894 ng/mL, Ferritin, Serum: 1163 -> 4271 ng/mL, C-Reactive Protein, Serum: 27.99 mg/dL -> 22.14, Pro-remy 1.41 ->1.08. Repeat Inflammatory markers in 48h if worsening.  - UA was negative, blood cultures show NGTD.   - continue with rocephin 2g IV q24h Day 7  - COVID Ab positive.   - continue with albuterol inhaler q6h PRN   - repeat CXR showed unchanged patchy bilateral parenchymal opacities   - Appreciate ID consult   - dispo planning     # Confusion likely secondary to hospital delirium? - resolved   - nonfocal neuro exam   - CTH negative for acute process   - patient successfully reoriented.     #HTN- c/w home meds    #Anxiety- c/w paroxetine    #H/o Prostate cancer  - Outpt oncology follow up    GI ppx: Pantoprazole 40mg PO Daily  DVT ppx: Lovenox 40mg SubQ  Activity: Increase as Tolerated  Diet: DASH   Dispo: From home   CODE STATUS: FULL

## 2021-01-27 ENCOUNTER — APPOINTMENT (OUTPATIENT)
Dept: UROLOGY | Facility: CLINIC | Age: 66
End: 2021-01-27

## 2021-01-27 DIAGNOSIS — Z85.46 PERSONAL HISTORY OF MALIGNANT NEOPLASM OF PROSTATE: ICD-10-CM

## 2021-01-27 DIAGNOSIS — Z87.442 PERSONAL HISTORY OF URINARY CALCULI: ICD-10-CM

## 2021-01-27 DIAGNOSIS — F41.9 ANXIETY DISORDER, UNSPECIFIED: ICD-10-CM

## 2021-01-27 DIAGNOSIS — U07.1 COVID-19: ICD-10-CM

## 2021-01-27 DIAGNOSIS — R74.01 ELEVATION OF LEVELS OF LIVER TRANSAMINASE LEVELS: ICD-10-CM

## 2021-01-27 DIAGNOSIS — J96.01 ACUTE RESPIRATORY FAILURE WITH HYPOXIA: ICD-10-CM

## 2021-01-27 DIAGNOSIS — Z87.891 PERSONAL HISTORY OF NICOTINE DEPENDENCE: ICD-10-CM

## 2021-01-27 DIAGNOSIS — E86.1 HYPOVOLEMIA: ICD-10-CM

## 2021-01-27 DIAGNOSIS — E87.8 OTHER DISORDERS OF ELECTROLYTE AND FLUID BALANCE, NOT ELSEWHERE CLASSIFIED: ICD-10-CM

## 2021-01-27 DIAGNOSIS — E87.1 HYPO-OSMOLALITY AND HYPONATREMIA: ICD-10-CM

## 2021-01-27 DIAGNOSIS — D56.3 THALASSEMIA MINOR: ICD-10-CM

## 2021-01-27 DIAGNOSIS — J12.82 PNEUMONIA DUE TO CORONAVIRUS DISEASE 2019: ICD-10-CM

## 2021-01-27 DIAGNOSIS — I10 ESSENTIAL (PRIMARY) HYPERTENSION: ICD-10-CM

## 2021-01-27 DIAGNOSIS — F32.9 MAJOR DEPRESSIVE DISORDER, SINGLE EPISODE, UNSPECIFIED: ICD-10-CM

## 2021-02-08 PROBLEM — C61 MALIGNANT NEOPLASM OF PROSTATE: Chronic | Status: ACTIVE | Noted: 2021-01-14

## 2021-02-09 ENCOUNTER — OUTPATIENT (OUTPATIENT)
Dept: OUTPATIENT SERVICES | Facility: HOSPITAL | Age: 66
LOS: 1 days | Discharge: HOME | End: 2021-02-09
Payer: COMMERCIAL

## 2021-02-09 DIAGNOSIS — Z98.890 OTHER SPECIFIED POSTPROCEDURAL STATES: Chronic | ICD-10-CM

## 2021-02-09 DIAGNOSIS — C61 MALIGNANT NEOPLASM OF PROSTATE: ICD-10-CM

## 2021-02-09 PROCEDURE — 76857 US EXAM PELVIC LIMITED: CPT | Mod: 26

## 2021-02-25 ENCOUNTER — APPOINTMENT (OUTPATIENT)
Dept: UROLOGY | Facility: CLINIC | Age: 66
End: 2021-02-25
Payer: COMMERCIAL

## 2021-02-25 VITALS — HEIGHT: 73 IN | BODY MASS INDEX: 33.13 KG/M2 | TEMPERATURE: 97.8 F | WEIGHT: 250 LBS

## 2021-02-25 PROCEDURE — 99213 OFFICE O/P EST LOW 20 MIN: CPT

## 2021-02-25 PROCEDURE — 99072 ADDL SUPL MATRL&STAF TM PHE: CPT

## 2021-02-25 NOTE — PHYSICAL EXAM
[General Appearance - Well Developed] : well developed [General Appearance - Well Nourished] : well nourished [Normal Appearance] : normal appearance [Well Groomed] : well groomed [General Appearance - In No Acute Distress] : no acute distress [Skin Color & Pigmentation] : normal skin color and pigmentation [Skin Lesions] : no skin lesions [Edema] : no peripheral edema [] : no respiratory distress [Oriented To Time, Place, And Person] : oriented to person, place, and time [Affect] : the affect was normal [Mood] : the mood was normal [Not Anxious] : not anxious [Normal Station and Gait] : the gait and station were normal for the patient's age [No Focal Deficits] : no focal deficits [Motor Exam] : the motor exam was normal [No Palpable Adenopathy] : no palpable adenopathy [FreeTextEntry1] : obese

## 2021-02-25 NOTE — HISTORY OF PRESENT ILLNESS
[Urinary Urgency] : urinary urgency [Urinary Frequency] : urinary frequency [Nocturia] : nocturia [Post-Void Dribbling] : post-void dribbling [None] : None [FreeTextEntry1] : 65-year-old male he post AS #1  TRUS BX on September 29, 2020. Patient denies any febrile events and reports only one day of hematuria. He generally feels well and offers no new complaints. \par Pathology= LLB  3+3  20%\par                 = LLM  3+3  22% \par                 = LM    3+3   23%\par                 = LA    3+3    6%\par                 = LLA   3+3   20%\par 2019 TRUS BX= LLM   3+3  13%  [Straining] : no straining [Weak Stream] : no weak stream [Dysuria] : no dysuria [Fever] : no fever

## 2021-02-25 NOTE — ASSESSMENT
[FreeTextEntry1] : #1. Prostate carcinoma, stage TI C., group 1 ,  very  low risk on AS- \par #2..Increase in prostate CA volume without increase in grade  histology\par #3. LUTS\par \par Plan\par -rto 3 months --  repeat PSA \par bladder US \par uroflow\par rtn after US \par \par \par

## 2021-06-03 ENCOUNTER — APPOINTMENT (OUTPATIENT)
Dept: UROLOGY | Facility: CLINIC | Age: 66
End: 2021-06-03
Payer: COMMERCIAL

## 2021-06-03 VITALS — HEIGHT: 73 IN | BODY MASS INDEX: 32.47 KG/M2 | TEMPERATURE: 97.7 F | WEIGHT: 245 LBS

## 2021-06-03 LAB
BILIRUB UR QL STRIP: NORMAL
GLUCOSE UR-MCNC: NORMAL
HCG UR QL: 0.2 EU/DL
HGB UR QL STRIP.AUTO: NORMAL
KETONES UR-MCNC: NORMAL
LEUKOCYTE ESTERASE UR QL STRIP: NORMAL
NITRITE UR QL STRIP: NORMAL
PH UR STRIP: 6.5
PROT UR STRIP-MCNC: NORMAL
SP GR UR STRIP: 1.02

## 2021-06-03 PROCEDURE — 99213 OFFICE O/P EST LOW 20 MIN: CPT

## 2021-06-03 PROCEDURE — 99072 ADDL SUPL MATRL&STAF TM PHE: CPT

## 2021-09-07 ENCOUNTER — APPOINTMENT (OUTPATIENT)
Dept: UROLOGY | Facility: CLINIC | Age: 66
End: 2021-09-07
Payer: COMMERCIAL

## 2021-09-07 VITALS — HEIGHT: 73 IN | BODY MASS INDEX: 32.47 KG/M2 | WEIGHT: 245 LBS

## 2021-09-07 LAB
BILIRUB UR QL STRIP: NORMAL
COLLECTION METHOD: NORMAL
GLUCOSE UR-MCNC: NORMAL
HCG UR QL: 0.2 EU/DL
HGB UR QL STRIP.AUTO: NORMAL
KETONES UR-MCNC: NORMAL
LEUKOCYTE ESTERASE UR QL STRIP: NORMAL
NITRITE UR QL STRIP: NORMAL
PH UR STRIP: 6.5
PROT UR STRIP-MCNC: NORMAL
SP GR UR STRIP: 1.02

## 2021-09-07 PROCEDURE — 99214 OFFICE O/P EST MOD 30 MIN: CPT

## 2021-12-08 ENCOUNTER — APPOINTMENT (OUTPATIENT)
Dept: UROLOGY | Facility: CLINIC | Age: 66
End: 2021-12-08
Payer: COMMERCIAL

## 2021-12-08 VITALS — HEIGHT: 73 IN | BODY MASS INDEX: 32.47 KG/M2 | WEIGHT: 245 LBS

## 2021-12-08 LAB
BILIRUB UR QL STRIP: NORMAL
CLARITY UR: CLEAR
COLLECTION METHOD: NORMAL
GLUCOSE UR-MCNC: NORMAL
HCG UR QL: 0.2 EU/DL
HGB UR QL STRIP.AUTO: NORMAL
KETONES UR-MCNC: NORMAL
LEUKOCYTE ESTERASE UR QL STRIP: NORMAL
NITRITE UR QL STRIP: NORMAL
PH UR STRIP: 7
PROT UR STRIP-MCNC: NORMAL
SP GR UR STRIP: 1.02

## 2021-12-08 PROCEDURE — 81003 URINALYSIS AUTO W/O SCOPE: CPT | Mod: NC,QW

## 2021-12-08 PROCEDURE — 99213 OFFICE O/P EST LOW 20 MIN: CPT

## 2022-03-22 ENCOUNTER — APPOINTMENT (OUTPATIENT)
Dept: UROLOGY | Facility: CLINIC | Age: 67
End: 2022-03-22
Payer: COMMERCIAL

## 2022-03-22 VITALS — BODY MASS INDEX: 32.47 KG/M2 | WEIGHT: 245 LBS | HEIGHT: 73 IN

## 2022-03-22 DIAGNOSIS — R31.21 ASYMPTOMATIC MICROSCOPIC HEMATURIA: ICD-10-CM

## 2022-03-22 PROCEDURE — 99214 OFFICE O/P EST MOD 30 MIN: CPT

## 2022-03-22 PROCEDURE — 51798 US URINE CAPACITY MEASURE: CPT

## 2022-03-22 PROCEDURE — 81003 URINALYSIS AUTO W/O SCOPE: CPT | Mod: NC,QW

## 2022-03-26 ENCOUNTER — NON-APPOINTMENT (OUTPATIENT)
Age: 67
End: 2022-03-26

## 2022-03-28 LAB — BACTERIA UR CULT: ABNORMAL

## 2022-06-22 ENCOUNTER — APPOINTMENT (OUTPATIENT)
Dept: UROLOGY | Facility: CLINIC | Age: 67
End: 2022-06-22

## 2022-06-22 VITALS — HEIGHT: 73 IN | WEIGHT: 245 LBS | BODY MASS INDEX: 32.47 KG/M2

## 2022-06-22 DIAGNOSIS — R35.1 NOCTURIA: ICD-10-CM

## 2022-06-22 PROCEDURE — 99213 OFFICE O/P EST LOW 20 MIN: CPT

## 2022-06-22 NOTE — ASSESSMENT
[FreeTextEntry1] : 1. Prostate carcinoma, stage TI C., group 1, very low risk (AS since 2019), s/p EBRT with Dr. Banegas 11/2/2021. \par 2.Bilateral nephrolithiasis\par 3. LUTS - specifically nocturia --DO , related to EBRT?\par \par \par Plan:\par -Discussed PSA results with the patient, discussed 0.5 value for those with radiation. \par -Discussed the use of antimuscarinic medication for bladder overactivity.  We discussed the expected efficacy and side effects.  Patient wishes to allow more time for spontaneous resolution of his nocturia before deciding on medical therapy.  I agree with his decision.\par -Discussed how increased frequency and nocturia can be related to irritation due to EBRT.\par -U/ Culture --call if abnl .\par -PSA 3 months\par -RTO 3 months. \par \par \par

## 2022-06-22 NOTE — HISTORY OF PRESENT ILLNESS
[Urinary Urgency] : urinary urgency [Urinary Frequency] : urinary frequency [Nocturia] : nocturia [Post-Void Dribbling] : post-void dribbling [None] : None [FreeTextEntry1] : 66 year old male presents to the office for a follow up of  Prostate carcinoma, stage T1c., group 1, very  low risk (AS since 2019), s/p EBRT complete in 11/2021 with Dr. Banegas. Patient reports still being bothered by his nocturia,  Otherwise. he reports feeling well and has no complaints post radiation. He is accompanied by his wife. \par \par All past and previous data reviewed:\par 2019 TRUS BX= LLM 3+3 13% [Original prostate biopsy]// 2018- pre biopsy- 6.2.\par AS #1 TRUS BX 9/29/20- pathology- carrie 3+3=6 in 3 cores, left lat mid, left base, left apex- aprox 20-22% of tissue.\par \par 03/2022 bladder scan= 0 cc // UA= BLO moderate, KALEY small\par 2/2021 Bladder scan: 26 cc (Bladder is not filled enough for uroflow)// US = Bladder wall thickness 0.7 cm. Pre void= 154 cc Post void= 11 cc (No feeling of fullness )\par \par psa   6/2022 = 0.8\par PSA  3/2022= 0.7\par PSA 11/2021= 3.9 ***** post EBRT \par PSA   8/2021= 7.9\par PSA  5/2021= 7.0 (covid vaccine 2 weeks prior)\par PSA  1/2021= 5.1\par PSA 8/2020 = 6.7\par PSA 4/2020 = 9.7\par Psa 1/2020 = 5.6\par PSa 10/2019 = 9.0\par PSA 7/2019 = 5.7 [Straining] : no straining [Weak Stream] : no weak stream [Dysuria] : no dysuria [Fever] : no fever

## 2022-06-22 NOTE — PHYSICAL EXAM
[General Appearance - Well Developed] : well developed [General Appearance - Well Nourished] : well nourished [Normal Appearance] : normal appearance [Well Groomed] : well groomed [General Appearance - In No Acute Distress] : no acute distress [Skin Color & Pigmentation] : normal skin color and pigmentation [Skin Lesions] : no skin lesions [Edema] : no peripheral edema [] : no respiratory distress [Oriented To Time, Place, And Person] : oriented to person, place, and time [Affect] : the affect was normal [Mood] : the mood was normal [Not Anxious] : not anxious [Normal Station and Gait] : the gait and station were normal for the patient's age [No Focal Deficits] : no focal deficits [Motor Exam] : the motor exam was normal [No Palpable Adenopathy] : no palpable adenopathy [FreeTextEntry1] : 03/2022 bladder scan= 0 cc

## 2022-06-26 LAB — BACTERIA UR CULT: NORMAL

## 2022-09-28 ENCOUNTER — APPOINTMENT (OUTPATIENT)
Dept: UROLOGY | Facility: CLINIC | Age: 67
End: 2022-09-28

## 2022-09-28 VITALS
BODY MASS INDEX: 32.47 KG/M2 | HEART RATE: 75 BPM | HEIGHT: 73 IN | TEMPERATURE: 97.6 F | SYSTOLIC BLOOD PRESSURE: 131 MMHG | WEIGHT: 245 LBS | DIASTOLIC BLOOD PRESSURE: 77 MMHG

## 2022-09-28 DIAGNOSIS — N32.81 OVERACTIVE BLADDER: ICD-10-CM

## 2022-09-28 LAB
BILIRUB UR QL STRIP: NORMAL
COLLECTION METHOD: NORMAL
GLUCOSE UR-MCNC: NORMAL
HCG UR QL: 0.2 EU/DL
HGB UR QL STRIP.AUTO: NORMAL
KETONES UR-MCNC: NORMAL
LEUKOCYTE ESTERASE UR QL STRIP: NORMAL
NITRITE UR QL STRIP: NORMAL
PH UR STRIP: 7
PROT UR STRIP-MCNC: NORMAL
SP GR UR STRIP: 1.01

## 2022-09-28 PROCEDURE — 99213 OFFICE O/P EST LOW 20 MIN: CPT

## 2022-09-28 PROCEDURE — 81003 URINALYSIS AUTO W/O SCOPE: CPT | Mod: NC,QW

## 2022-09-28 NOTE — ASSESSMENT
[FreeTextEntry1] : 1. Prostate carcinoma, stage TI C., group 1, very low risk (AS since 2019), s/p EBRT with Dr. Banegas 11/2/2021. -           rising PSA- ?  radioresistant for PSA bounce.\par 2. Bilateral nephrolithiasis\par 3. LUTS - specifically nocturia --DO , related to EBRT?\par \par \par Plan:\par -Discussed the difference between radioresistant prostate cancer and PSA bounce which can be seen and is related to postradiation therapy for prostate carcinoma.  The recent rise in PSA makes it slightly difficult to determine whether PSA bounce already resistant prostate carcinoma is actually present.  Understanding the 2 concepts the patient and wife would like to proceed with an investigational study, therefore PET scan was discussed and agreed to.\par -proceed with a PSMA PET/CT\par -rto after above\par \par

## 2022-09-28 NOTE — END OF VISIT
[Time Spent: ___ minutes] : I have spent [unfilled] minutes of time on the encounter. [FreeTextEntry3] : Pt seen ,evaluated ,examined  by me with PA/ RN present and agree to findings , plan\par

## 2022-09-28 NOTE — HISTORY OF PRESENT ILLNESS
[Urinary Urgency] : urinary urgency [Urinary Frequency] : urinary frequency [Nocturia] : nocturia [Post-Void Dribbling] : post-void dribbling [None] : None [FreeTextEntry1] : 67 year old male presents to the office for a follow up of  Prostate carcinoma, stage T1c., group 1, very  low risk (AS since 2019), s/p EBRT complete in 11/2021 with Dr. Banegas due to increased prostate cancer volume reported on active surveillance biopsy and continued rising of PSA.\par Patient reports still being bothered by his nocturia,  Otherwise. he reports feeling well and has no complaints post radiation. He is accompanied by his wife. \par patient maintained on Flomax and has used anticholinergics in the past but has since stopped and doesn’t want to re-start these meds \par \par All past and previous data reviewed:\par 2019 TRUS BX= LLM 3+3 13% [Original prostate biopsy]// 2018- pre biopsy- 6.2.\par AS #1 TRUS BX 9/29/20- pathology- carrie 3+3=6 in 3 cores, left lat mid, left base, left apex- aprox 20-22% of tissue.\par \par 03/2022 bladder scan= 0 cc // UA= BLO moderate, KALEY small\par 2/2021 Bladder scan: 26 cc (Bladder is not filled enough for uroflow)// US = Bladder wall thickness 0.7 cm. Pre void= 154 cc Post void= 11 cc (No feeling of fullness )\par \par PSA-   9/2022- 1.2              9/28/22   udip- neg\par           6/2022 = 0.8\par            3/2022= 0.7\par          11/2021= 3.9 ***** post EBRT \par           8/2021= 7.9\par   5/2021= 7.0 (covid vaccine 2 weeks prior)\par   1/2021= 5.1\par  [Straining] : no straining [Weak Stream] : no weak stream [Dysuria] : no dysuria [Fever] : no fever

## 2022-11-09 ENCOUNTER — OUTPATIENT (OUTPATIENT)
Dept: OUTPATIENT SERVICES | Facility: HOSPITAL | Age: 67
LOS: 1 days | Discharge: HOME | End: 2022-11-09

## 2022-11-09 ENCOUNTER — RESULT REVIEW (OUTPATIENT)
Age: 67
End: 2022-11-09

## 2022-11-09 DIAGNOSIS — C61 MALIGNANT NEOPLASM OF PROSTATE: ICD-10-CM

## 2022-11-09 DIAGNOSIS — Z98.890 OTHER SPECIFIED POSTPROCEDURAL STATES: Chronic | ICD-10-CM

## 2022-11-09 LAB — GLUCOSE BLDC GLUCOMTR-MCNC: 91 MG/DL — SIGNIFICANT CHANGE UP (ref 70–99)

## 2022-11-09 PROCEDURE — 78816 PET IMAGE W/CT FULL BODY: CPT | Mod: 26

## 2022-11-29 ENCOUNTER — APPOINTMENT (OUTPATIENT)
Dept: UROLOGY | Facility: CLINIC | Age: 67
End: 2022-11-29

## 2022-11-29 PROCEDURE — 99213 OFFICE O/P EST LOW 20 MIN: CPT

## 2023-04-20 ENCOUNTER — NON-APPOINTMENT (OUTPATIENT)
Age: 68
End: 2023-04-20

## 2023-04-20 ENCOUNTER — APPOINTMENT (OUTPATIENT)
Dept: ORTHOPEDIC SURGERY | Facility: CLINIC | Age: 68
End: 2023-04-20
Payer: COMMERCIAL

## 2023-04-20 VITALS — WEIGHT: 245 LBS | HEIGHT: 72 IN | BODY MASS INDEX: 33.18 KG/M2

## 2023-04-20 DIAGNOSIS — M17.12 UNILATERAL PRIMARY OSTEOARTHRITIS, LEFT KNEE: ICD-10-CM

## 2023-04-20 PROCEDURE — 99203 OFFICE O/P NEW LOW 30 MIN: CPT

## 2023-04-20 PROCEDURE — 73562 X-RAY EXAM OF KNEE 3: CPT | Mod: LT

## 2023-04-20 NOTE — IMAGING
[de-identified] :   Physical examination of the left knee:  No swelling, ecchymosis, or erythema appreciated.  Skin is intact.  Patient is mildly tender to palpation on the medial joint line.  Positive patellar grind.  Negative tenderness over the patella tendon, or quad tendon.  Patient able straight leg raise unstable.  Negative Isaura's.  Negative Lachman's.  Patient has full range of motion left knee upon flexion-extension or experiences mild pain upon flexion.  Calf is soft and nontender.  Good strength throughout.  Sensory motor intact distally.

## 2023-04-20 NOTE — DISCUSSION/SUMMARY
[de-identified] : Treatment plan as discussed:\par \par   My clinical suspicion is high for flare-up of patellofemoral arthritis of the left knee given the patient's history, physical examination findings, x-ray findings.\par \par   I recommended pain medication however the patient is on blood thinners and is unable to take NSAIDs.  The patient will continue with Tylenol as needed for pain.\par \par I recommended patient rest, ice, compress, and elevate the left knee regularly. Encouraged activity modification as tolerable. Encouraged gentle range of motion to avoid stiffness. No gym /sports at least until further evaluation.\par \par Script for physical therapy provided for improved ROM and strengthening of surrounding musculature. Benefits discussed.\par \par All questions and concerns addressed to patient's satisfaction. Patient expresses full understanding of treatment plan.\par Patient will follow up in 4-6 weeks with Dr. Meyers for further evaluation treatment.\par Patient was seen under  supervision of Dr. Negrete.\par

## 2023-04-20 NOTE — HISTORY OF PRESENT ILLNESS
[de-identified] :  patient is a 67-year-old male here for evaluation of left knee pain.Patient reports a nontraumatic aching pain that which comes and goes to his left knee that started about 2 weeks ago.  Patient reports he does a lot of standing and walking on his feet at work and his pain worsens at the long shifts.  Denies any trauma/ falls.  Denies any popping/clicking.  She is able to ambulate and bear weight ever experiences mild pain when doing so.  Denies any history of injuries and surgeries left knee prior to this incident.

## 2023-04-20 NOTE — DATA REVIEWED
[FreeTextEntry1] :   X-rays of the left knee taken in the office today:  No acute fractures, subluxations, or dislocations.  Moderate patellofemoral arthritis appreciated.

## 2023-05-22 ENCOUNTER — APPOINTMENT (OUTPATIENT)
Dept: ORTHOPEDIC SURGERY | Facility: CLINIC | Age: 68
End: 2023-05-22
Payer: COMMERCIAL

## 2023-05-22 DIAGNOSIS — M94.20 CHONDROMALACIA, UNSPECIFIED SITE: ICD-10-CM

## 2023-05-22 PROCEDURE — 99214 OFFICE O/P EST MOD 30 MIN: CPT

## 2023-05-22 NOTE — HISTORY OF PRESENT ILLNESS
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 Pt denies trauma to the knee. He does a lot of standing and walking at work. He is working full duty, part time. \par \par h/o high blood pressure, anxiety, depression\par \par on exam no effusion nontender over the joint lines good range of motion stable some patellofemoral crepitus\par \par Diagnosis chondromalacia the patellofemoral area moderate to severe\par \par recommending anti-inflammatories.  Side effects were discussed we will check his blood pressure also in 1 pill after dinner for 1 to 2 weeks, also recommended to lose weight.  Gave the patient a exercise sheet and will have him go to physical therapy he had disability papers so we can sit in a chair occasionally and I agree with that\par \par

## 2023-05-22 NOTE — DISCHARGE NOTE PROVIDER - PROVIDER RX CONTACT NUMBER
OPERATIVE REPORT    PATIENT NAME: Natalie Montes    :  1950  MRN: [id number]  Pt Location: [facility] OR ROOM 01    Surgery Date: 2023    Surgeon(s) and Role:     * Shen Langford MD - Primary    Posterior subcapsular polar age-related cataract, right eye [H25 041]    Post-Op Diagnosis Codes:     * Posterior subcapsular polar age-related cataract, right eye [H25 041]    Procedure(s):  EXTRACTION EXTRACAPSULAR CATARACT PHACO INTRAOCULAR LENS (IOL)    Anesthesia Type:   IV Sedation with Anesthesia    Operative Indications:  Posterior subcapsular polar age-related cataract, right eye [H25 041]  Decreased vision to 20/60  With problems driving  Pt requested cataract sx the right eye    Procedure and Technique:    Procedure Details     The patient was brought in the OR in stable condition and placed on the operative table  The right eye was prepped and draped in the usual sterile manner  Attention was directed to the right eye where a lid speculum was placed  A 2 4 mm clear corneal incision was made temperally  1 cc of Shugarcaine was irrigated into the anterior chamber followed by viscoat  The side port incision was placed superiorly  The capsularrhexis was made and the nucleus was hydrodissected with BSS  The nucleus was then removed with the phaco handpiece followed by removal of the cortical material with the I/A handpiece  The capsular bag was then filled with Provisc  The IOL was folded and placed in to the capsular bag and centered well  The remaining Provisc was removed from the eye with the I/A  The wounds were hydrated with BSS and found to be water tight  The lid speculum was removed and 2 drops of Gatifloxicin were placed over the cornea  A protective eye shield was taped over the eye and the patient went to PACU in stable condition  I will see the patient in the office tomorrow and the expected post op period is a few weeks         Complications: None        Disposition: PACU   Condition: Stable    SIGNATURE: Jessica Murrieta MD  DATE: May 22, 2023  TIME: 11:34 AM [phone]

## 2023-05-24 ENCOUNTER — APPOINTMENT (OUTPATIENT)
Dept: UROLOGY | Facility: CLINIC | Age: 68
End: 2023-05-24
Payer: COMMERCIAL

## 2023-05-24 VITALS
DIASTOLIC BLOOD PRESSURE: 79 MMHG | BODY MASS INDEX: 33.18 KG/M2 | HEART RATE: 97 BPM | WEIGHT: 245 LBS | SYSTOLIC BLOOD PRESSURE: 151 MMHG | HEIGHT: 72 IN

## 2023-05-24 DIAGNOSIS — Z00.00 ENCOUNTER FOR GENERAL ADULT MEDICAL EXAMINATION W/OUT ABNORMAL FINDINGS: ICD-10-CM

## 2023-05-24 DIAGNOSIS — R39.9 UNSPECIFIED SYMPTOMS AND SIGNS INVOLVING THE GENITOURINARY SYSTEM: ICD-10-CM

## 2023-05-24 DIAGNOSIS — C61 MALIGNANT NEOPLASM OF PROSTATE: ICD-10-CM

## 2023-05-24 DIAGNOSIS — N20.0 CALCULUS OF KIDNEY: ICD-10-CM

## 2023-05-24 DIAGNOSIS — R97.21 RISING PSA FOLLOWING TREATMENT FOR MALIGNANT NEOPLASM OF PROSTATE: ICD-10-CM

## 2023-05-24 LAB
BILIRUB UR QL STRIP: NORMAL
COLLECTION METHOD: NORMAL
GLUCOSE UR-MCNC: NORMAL
HCG UR QL: 0.2 EU/DL
HGB UR QL STRIP.AUTO: NORMAL
KETONES UR-MCNC: NORMAL
LEUKOCYTE ESTERASE UR QL STRIP: NORMAL
NITRITE UR QL STRIP: NORMAL
PH UR STRIP: 6
PROT UR STRIP-MCNC: NORMAL
SP GR UR STRIP: 1.01

## 2023-05-24 PROCEDURE — 81003 URINALYSIS AUTO W/O SCOPE: CPT | Mod: NC,QW

## 2023-05-24 PROCEDURE — 99214 OFFICE O/P EST MOD 30 MIN: CPT

## 2023-07-13 ENCOUNTER — NON-APPOINTMENT (OUTPATIENT)
Age: 68
End: 2023-07-13

## 2023-07-24 ENCOUNTER — APPOINTMENT (OUTPATIENT)
Dept: ORTHOPEDIC SURGERY | Facility: CLINIC | Age: 68
End: 2023-07-24

## 2023-09-07 ENCOUNTER — APPOINTMENT (OUTPATIENT)
Dept: ORTHOPEDIC SURGERY | Facility: CLINIC | Age: 68
End: 2023-09-07
Payer: COMMERCIAL

## 2023-09-07 ENCOUNTER — NON-APPOINTMENT (OUTPATIENT)
Age: 68
End: 2023-09-07

## 2023-09-07 DIAGNOSIS — S49.92XA UNSPECIFIED INJURY OF LEFT SHOULDER AND UPPER ARM, INITIAL ENCOUNTER: ICD-10-CM

## 2023-09-07 DIAGNOSIS — S42.295A OTHER NONDISPLACED FRACTURE OF UPPER END OF LEFT HUMERUS, INITIAL ENCOUNTER FOR CLOSED FRACTURE: ICD-10-CM

## 2023-09-07 DIAGNOSIS — S52.125A NONDISPLACED FRACTURE OF HEAD OF LEFT RADIUS, INITIAL ENCOUNTER FOR CLOSED FRACTURE: ICD-10-CM

## 2023-09-07 PROCEDURE — 73080 X-RAY EXAM OF ELBOW: CPT | Mod: LT

## 2023-09-07 PROCEDURE — 99214 OFFICE O/P EST MOD 30 MIN: CPT

## 2023-09-07 PROCEDURE — L3670: CPT | Mod: LT

## 2023-09-07 PROCEDURE — 73030 X-RAY EXAM OF SHOULDER: CPT | Mod: LT

## 2023-09-07 NOTE — DATA REVIEWED
[FreeTextEntry1] : X-ray images were obtained at the office today.  AP internal, AP external, Y view, axillary view of the left shoulder reveals a proximal humerus fracture, best noted at the axillary view, humeral head is inferiorly subluxed.  AP, lateral, oblique views of the left elbow reveal a fracture of the radial head that is nondisplaced

## 2023-09-07 NOTE — DISCUSSION/SUMMARY
[de-identified] : Patient has approximately wrist fracture and radial head fracture of the left upper extremity.  Today, patient's ring was removed from his left ring finger.  At this time, I placed patient in a sling, encouraged him to come out of the sling in approximately 3 days to work on range of motion of the elbow, and to come out a few times a day after that point for range of motion of the elbow.  Patient was encouraged not to move the shoulder at all however, and not to bear any weight on the extremity.  Patient was given a prescription for naproxen 500 mg to take as needed for pain and inflammation.  Pt was educated about risks and benefits of anti-inflammatories.  Anti-inflammatories can irritate the stomach lining, so if there are any symptoms of acid reflux or heartburn the patient was instructed to stop taking the medication. To help prevent this, it is best to take with a meal. They cannot be taken if the pt is on blood thinners and if the patient is at risk of high blood pressure, blood pressure should be monitored daily while taking the medication.  Patient was also encouraged apply ice to the area.  Patient will follow-up in approximately 2 weeks.  Patient agrees above plan all questions were answered today

## 2023-09-07 NOTE — HISTORY OF PRESENT ILLNESS
[de-identified] : 68-year-old male presents with left elbow and left shoulder pain.  Yesterday, patient fell, landing on his left side.  Since then he has had limited range of motion of the left shoulder and pain.  Patient also has pain of the elbow with range of motion.  Patient denies any past injuries or surgeries to the shoulder or elbow.  Patient is right-hand dominant.  Patient has been taking anti-inflammatories for pain relief.

## 2023-09-07 NOTE — PHYSICAL EXAM
[de-identified] : Physical exam of the left shoulder: -No erythema, edema, ecchymosis or acute deformities.  Skin intact -Patient has tenderness palpation over the proximal humerus -Patient has extremely limited range of motion due to pain -+2 radial pulse, sensation intact to light touch   Physical exam of the left elbow: -No ecchymosis, erythema, edema present to the right upper extremity.  Skin intact. -Tenderness over the radial head -Patient can fully flex and extend the elbow.  Patient has pain with supination pronation over the radial head -Radial pulse +2, sensation intact to light touch

## 2023-09-20 ENCOUNTER — APPOINTMENT (OUTPATIENT)
Dept: MRI IMAGING | Facility: CLINIC | Age: 68
End: 2023-09-20

## 2023-10-13 ENCOUNTER — APPOINTMENT (OUTPATIENT)
Dept: ORTHOPEDIC SURGERY | Facility: CLINIC | Age: 68
End: 2023-10-13
Payer: COMMERCIAL

## 2023-10-13 VITALS — HEIGHT: 73 IN | WEIGHT: 245 LBS | BODY MASS INDEX: 32.47 KG/M2

## 2023-10-13 PROCEDURE — 99213 OFFICE O/P EST LOW 20 MIN: CPT

## 2023-11-14 ENCOUNTER — APPOINTMENT (OUTPATIENT)
Dept: ORTHOPEDIC SURGERY | Facility: CLINIC | Age: 68
End: 2023-11-14
Payer: COMMERCIAL

## 2023-11-14 VITALS — HEIGHT: 73 IN | WEIGHT: 245 LBS | BODY MASS INDEX: 32.47 KG/M2

## 2023-11-14 PROCEDURE — 99214 OFFICE O/P EST MOD 30 MIN: CPT | Mod: 25

## 2023-11-14 PROCEDURE — 20611 DRAIN/INJ JOINT/BURSA W/US: CPT | Mod: LT

## 2023-11-21 ENCOUNTER — NON-APPOINTMENT (OUTPATIENT)
Age: 68
End: 2023-11-21

## 2023-11-30 ENCOUNTER — APPOINTMENT (OUTPATIENT)
Dept: UROLOGY | Facility: CLINIC | Age: 68
End: 2023-11-30
Payer: COMMERCIAL

## 2023-11-30 VITALS
HEART RATE: 89 BPM | BODY MASS INDEX: 32.47 KG/M2 | HEIGHT: 73 IN | TEMPERATURE: 97.3 F | RESPIRATION RATE: 16 BRPM | WEIGHT: 245 LBS | SYSTOLIC BLOOD PRESSURE: 142 MMHG | DIASTOLIC BLOOD PRESSURE: 72 MMHG | OXYGEN SATURATION: 98 %

## 2023-11-30 PROCEDURE — 99214 OFFICE O/P EST MOD 30 MIN: CPT

## 2023-12-12 ENCOUNTER — RESULT REVIEW (OUTPATIENT)
Age: 68
End: 2023-12-12

## 2023-12-12 ENCOUNTER — OUTPATIENT (OUTPATIENT)
Dept: OUTPATIENT SERVICES | Facility: HOSPITAL | Age: 68
LOS: 1 days | End: 2023-12-12
Payer: COMMERCIAL

## 2023-12-12 DIAGNOSIS — Z98.890 OTHER SPECIFIED POSTPROCEDURAL STATES: Chronic | ICD-10-CM

## 2023-12-12 DIAGNOSIS — N20.0 CALCULUS OF KIDNEY: ICD-10-CM

## 2023-12-12 PROCEDURE — 74018 RADEX ABDOMEN 1 VIEW: CPT

## 2023-12-12 PROCEDURE — 74018 RADEX ABDOMEN 1 VIEW: CPT | Mod: 26

## 2023-12-13 DIAGNOSIS — N20.0 CALCULUS OF KIDNEY: ICD-10-CM

## 2024-01-17 ENCOUNTER — OUTPATIENT (OUTPATIENT)
Dept: OUTPATIENT SERVICES | Facility: HOSPITAL | Age: 69
LOS: 1 days | End: 2024-01-17
Payer: COMMERCIAL

## 2024-01-17 VITALS
HEART RATE: 60 BPM | TEMPERATURE: 98 F | DIASTOLIC BLOOD PRESSURE: 73 MMHG | RESPIRATION RATE: 18 BRPM | OXYGEN SATURATION: 100 % | SYSTOLIC BLOOD PRESSURE: 142 MMHG | HEIGHT: 72.83 IN | WEIGHT: 244.71 LBS

## 2024-01-17 DIAGNOSIS — Z98.890 OTHER SPECIFIED POSTPROCEDURAL STATES: Chronic | ICD-10-CM

## 2024-01-17 DIAGNOSIS — Z01.818 ENCOUNTER FOR OTHER PREPROCEDURAL EXAMINATION: ICD-10-CM

## 2024-01-17 DIAGNOSIS — M75.102 UNSPECIFIED ROTATOR CUFF TEAR OR RUPTURE OF LEFT SHOULDER, NOT SPECIFIED AS TRAUMATIC: ICD-10-CM

## 2024-01-17 LAB
ALBUMIN SERPL ELPH-MCNC: 4.2 G/DL — SIGNIFICANT CHANGE UP (ref 3.5–5.2)
ALP SERPL-CCNC: 66 U/L — SIGNIFICANT CHANGE UP (ref 30–115)
ALT FLD-CCNC: 13 U/L — SIGNIFICANT CHANGE UP (ref 0–41)
ANION GAP SERPL CALC-SCNC: 13 MMOL/L — SIGNIFICANT CHANGE UP (ref 7–14)
AST SERPL-CCNC: 13 U/L — SIGNIFICANT CHANGE UP (ref 0–41)
BASOPHILS # BLD AUTO: 0.05 K/UL — SIGNIFICANT CHANGE UP (ref 0–0.2)
BASOPHILS NFR BLD AUTO: 0.6 % — SIGNIFICANT CHANGE UP (ref 0–1)
BILIRUB SERPL-MCNC: 0.4 MG/DL — SIGNIFICANT CHANGE UP (ref 0.2–1.2)
BUN SERPL-MCNC: 26 MG/DL — HIGH (ref 10–20)
CALCIUM SERPL-MCNC: 9.7 MG/DL — SIGNIFICANT CHANGE UP (ref 8.4–10.5)
CHLORIDE SERPL-SCNC: 103 MMOL/L — SIGNIFICANT CHANGE UP (ref 98–110)
CO2 SERPL-SCNC: 25 MMOL/L — SIGNIFICANT CHANGE UP (ref 17–32)
CREAT SERPL-MCNC: 0.8 MG/DL — SIGNIFICANT CHANGE UP (ref 0.7–1.5)
EGFR: 95 ML/MIN/1.73M2 — SIGNIFICANT CHANGE UP
EOSINOPHIL # BLD AUTO: 0.1 K/UL — SIGNIFICANT CHANGE UP (ref 0–0.7)
EOSINOPHIL NFR BLD AUTO: 1.2 % — SIGNIFICANT CHANGE UP (ref 0–8)
GLUCOSE SERPL-MCNC: 101 MG/DL — HIGH (ref 70–99)
HCT VFR BLD CALC: 38.6 % — LOW (ref 42–52)
HGB BLD-MCNC: 11.9 G/DL — LOW (ref 14–18)
IMM GRANULOCYTES NFR BLD AUTO: 0.3 % — SIGNIFICANT CHANGE UP (ref 0.1–0.3)
LYMPHOCYTES # BLD AUTO: 1.23 K/UL — SIGNIFICANT CHANGE UP (ref 1.2–3.4)
LYMPHOCYTES # BLD AUTO: 14.3 % — LOW (ref 20.5–51.1)
MCHC RBC-ENTMCNC: 23.1 PG — LOW (ref 27–31)
MCHC RBC-ENTMCNC: 30.8 G/DL — LOW (ref 32–37)
MCV RBC AUTO: 75 FL — LOW (ref 80–94)
MONOCYTES # BLD AUTO: 0.57 K/UL — SIGNIFICANT CHANGE UP (ref 0.1–0.6)
MONOCYTES NFR BLD AUTO: 6.6 % — SIGNIFICANT CHANGE UP (ref 1.7–9.3)
NEUTROPHILS # BLD AUTO: 6.64 K/UL — HIGH (ref 1.4–6.5)
NEUTROPHILS NFR BLD AUTO: 77 % — HIGH (ref 42.2–75.2)
NRBC # BLD: 0 /100 WBCS — SIGNIFICANT CHANGE UP (ref 0–0)
PLATELET # BLD AUTO: 203 K/UL — SIGNIFICANT CHANGE UP (ref 130–400)
PMV BLD: 11.7 FL — HIGH (ref 7.4–10.4)
POTASSIUM SERPL-MCNC: 4.8 MMOL/L — SIGNIFICANT CHANGE UP (ref 3.5–5)
POTASSIUM SERPL-SCNC: 4.8 MMOL/L — SIGNIFICANT CHANGE UP (ref 3.5–5)
PROT SERPL-MCNC: 6.8 G/DL — SIGNIFICANT CHANGE UP (ref 6–8)
RBC # BLD: 5.15 M/UL — SIGNIFICANT CHANGE UP (ref 4.7–6.1)
RBC # FLD: 15.9 % — HIGH (ref 11.5–14.5)
SODIUM SERPL-SCNC: 141 MMOL/L — SIGNIFICANT CHANGE UP (ref 135–146)
WBC # BLD: 8.62 K/UL — SIGNIFICANT CHANGE UP (ref 4.8–10.8)
WBC # FLD AUTO: 8.62 K/UL — SIGNIFICANT CHANGE UP (ref 4.8–10.8)

## 2024-01-17 PROCEDURE — 99214 OFFICE O/P EST MOD 30 MIN: CPT | Mod: 25

## 2024-01-17 PROCEDURE — 36415 COLL VENOUS BLD VENIPUNCTURE: CPT

## 2024-01-17 PROCEDURE — 85025 COMPLETE CBC W/AUTO DIFF WBC: CPT

## 2024-01-17 PROCEDURE — 80053 COMPREHEN METABOLIC PANEL: CPT

## 2024-01-17 PROCEDURE — 93010 ELECTROCARDIOGRAM REPORT: CPT

## 2024-01-17 PROCEDURE — 93005 ELECTROCARDIOGRAM TRACING: CPT

## 2024-01-17 RX ORDER — ASPIRIN/CALCIUM CARB/MAGNESIUM 324 MG
0 TABLET ORAL
Refills: 0 | DISCHARGE

## 2024-01-17 RX ORDER — ASPIRIN/CALCIUM CARB/MAGNESIUM 324 MG
1 TABLET ORAL
Refills: 0 | DISCHARGE

## 2024-01-17 NOTE — H&P PST ADULT - NSICDXFAMILYHX_GEN_ALL_CORE_FT
FAMILY HISTORY:  Father  Still living? Unknown  FH: CVA (cerebrovascular accident), Age at diagnosis: Age Unknown  FH: diabetes mellitus, Age at diagnosis: Age Unknown    Mother  Still living? No  FH: diabetes mellitus, Age at diagnosis: Age Unknown

## 2024-01-17 NOTE — H&P PST ADULT - HISTORY OF PRESENT ILLNESS
Patient is a 68 year old male presenting to PAST in preparation for  LEFT SHOULDER ARTHROSCOPY, ROTATOR CUFF REPAIR, DECOMPRESSION, DISTAL CLAVICAL EXCISION on  1/29 under gen anesthesia by Dr. Roblero  PATIENT CURRENTLY DENIES CHEST PAIN  SHORTNESS OF BREATH  PALPITATIONS,  DYSURIA, OR UPPER RESPIRATORY INFECTION IN PAST 2 WEEKS    Anesthesia Alert  NO--Difficult Airway  NO--History of neck surgery or radiation  NO--Limited ROM of neck  NO--History of Malignant hyperthermia  NO--Personal or family history of Pseudocholinesterase deficiency  NO--Prior Anesthesia Complication  NO--Latex Allergy  NO--Loose teeth  NO--History of Rheumatoid Arthritis  NO--RAMOS  NO-- BLEEDING RISK  NO--Other_____      As per patient, this is their complete medical and surgical history, including medications both prescribed or over the counter.  Patient verbalized understanding of instructions and was given the opportunity to ask questions and have them answered.     Patient is a 68 year old male presenting to PAST in preparation for  LEFT SHOULDER ARTHROSCOPY, ROTATOR CUFF REPAIR, DECOMPRESSION, DISTAL CLAVICAL EXCISION on  1/29 under gen anesthesia by Dr. Roblero  Reports h/o fall in Sept 2023, he injured his shoulder. He has been going to PT but remains uncomfortable.Has an MRI which revealed a tear now scheduled for above  PATIENT CURRENTLY DENIES CHEST PAIN  SHORTNESS OF BREATH  PALPITATIONS,  DYSURIA, OR UPPER RESPIRATORY INFECTION IN PAST 2 WEEKS    Anesthesia Alert  NO--Difficult Airway  NO--History of neck surgery or radiation  NO--Limited ROM of neck  NO--History of Malignant hyperthermia  NO--Personal or family history of Pseudocholinesterase deficiency  NO--Prior Anesthesia Complication  NO--Latex Allergy  NO--Loose teeth  NO--History of Rheumatoid Arthritis  NO--RAMOS  NO-- BLEEDING RISK  NO--Other_____    D[employer] Activity Status Index (DASI) from [employer]  on 1/17/2024      RESULT SUMMARY:  58.2 points  The higher the score (maximum 58.2), the higher the functional status.    9.89 METs        INPUTS:  Take care of self —> 2.75 = Yes  Walk indoors —> 1.75 = Yes  Walk 1&ndash;2 blocks on level ground —> 2.75 = Yes  Climb a flight of stairs or walk up a hill —> 5.5 = Yes  Run a short distance —> 8 = Yes  Do light work around the house —> 2.7 = Yes  Do moderate work around the house —> 3.5 = Yes  Do heavy work around the house —> 8 = Yes  Do yardwork —> 4.5 = Yes  Have sexual relations —> 5.25 = Yes  Participate in moderate recreational activities —> 6 = Yes  Participate in strenuous sports —> 7.5 = Yes      Revised Cardiac Risk Index for Pre-Operative Risk from [employer]  on 1/17/2024      RESULT SUMMARY:  0 points  Class I Risk    3.9 %  30-day risk of death, MI, or cardiac arrest    From Duceppe 2017. These numbers are higher than those from the original study (Bassam 1999). See Evidence for details.      INPUTS:  Elevated-risk surgery —> 0 = No  History of ischemic heart disease —> 0 = No  History of congestive heart failure —> 0 = No  History of cerebrovascular disease —> 0 = No  Pre-operative treatment with insulin —> 0 = No  Pre-operative creatinine >2 mg/dL / 176.8 µmol/L —> 0 = No        As per patient, this is their complete medical and surgical history, including medications both prescribed or over the counter.  Patient verbalized understanding of instructions and was given the opportunity to ask questions and have them answered.

## 2024-01-18 DIAGNOSIS — Z01.818 ENCOUNTER FOR OTHER PREPROCEDURAL EXAMINATION: ICD-10-CM

## 2024-01-18 DIAGNOSIS — M75.102 UNSPECIFIED ROTATOR CUFF TEAR OR RUPTURE OF LEFT SHOULDER, NOT SPECIFIED AS TRAUMATIC: ICD-10-CM

## 2024-01-29 ENCOUNTER — TRANSCRIPTION ENCOUNTER (OUTPATIENT)
Age: 69
End: 2024-01-29

## 2024-01-29 ENCOUNTER — APPOINTMENT (OUTPATIENT)
Dept: ORTHOPEDIC SURGERY | Facility: AMBULATORY SURGERY CENTER | Age: 69
End: 2024-01-29

## 2024-01-29 ENCOUNTER — OUTPATIENT (OUTPATIENT)
Dept: OUTPATIENT SERVICES | Facility: HOSPITAL | Age: 69
LOS: 1 days | Discharge: HOME | End: 2024-01-29
Payer: COMMERCIAL

## 2024-01-29 VITALS
SYSTOLIC BLOOD PRESSURE: 146 MMHG | OXYGEN SATURATION: 97 % | WEIGHT: 244.71 LBS | HEART RATE: 65 BPM | RESPIRATION RATE: 18 BRPM | TEMPERATURE: 99 F | DIASTOLIC BLOOD PRESSURE: 76 MMHG | HEIGHT: 68 IN

## 2024-01-29 VITALS
OXYGEN SATURATION: 98 % | DIASTOLIC BLOOD PRESSURE: 79 MMHG | SYSTOLIC BLOOD PRESSURE: 133 MMHG | HEART RATE: 74 BPM | RESPIRATION RATE: 18 BRPM

## 2024-01-29 DIAGNOSIS — Z98.890 OTHER SPECIFIED POSTPROCEDURAL STATES: Chronic | ICD-10-CM

## 2024-01-29 DIAGNOSIS — M75.102 UNSPECIFIED ROTATOR CUFF TEAR OR RUPTURE OF LEFT SHOULDER, NOT SPECIFIED AS TRAUMATIC: ICD-10-CM

## 2024-01-29 DIAGNOSIS — Z87.39 PERSONAL HISTORY OF OTHER DISEASES OF THE MUSCULOSKELETAL SYSTEM AND CONNECTIVE TISSUE: ICD-10-CM

## 2024-01-29 PROCEDURE — 23430 REPAIR BICEPS TENDON: CPT | Mod: LT

## 2024-01-29 PROCEDURE — C1713: CPT

## 2024-01-29 PROCEDURE — 29826 SHO ARTHRS SRG DECOMPRESSION: CPT | Mod: LT

## 2024-01-29 PROCEDURE — 29827 SHO ARTHRS SRG RT8TR CUF RPR: CPT | Mod: LT

## 2024-01-29 RX ORDER — SODIUM CHLORIDE 9 MG/ML
1000 INJECTION, SOLUTION INTRAVENOUS
Refills: 0 | Status: DISCONTINUED | OUTPATIENT
Start: 2024-01-29 | End: 2024-01-29

## 2024-01-29 RX ORDER — CLONAZEPAM 1 MG
1 TABLET ORAL
Refills: 0 | DISCHARGE

## 2024-01-29 RX ORDER — ONDANSETRON 8 MG/1
4 TABLET, FILM COATED ORAL ONCE
Refills: 0 | Status: DISCONTINUED | OUTPATIENT
Start: 2024-01-29 | End: 2024-01-29

## 2024-01-29 RX ORDER — HYDROMORPHONE HYDROCHLORIDE 2 MG/ML
0.5 INJECTION INTRAMUSCULAR; INTRAVENOUS; SUBCUTANEOUS
Refills: 0 | Status: DISCONTINUED | OUTPATIENT
Start: 2024-01-29 | End: 2024-01-29

## 2024-01-29 RX ORDER — OXYCODONE HYDROCHLORIDE 5 MG/1
5 TABLET ORAL ONCE
Refills: 0 | Status: DISCONTINUED | OUTPATIENT
Start: 2024-01-29 | End: 2024-01-29

## 2024-01-29 RX ORDER — ACETAMINOPHEN 500 MG
1000 TABLET ORAL ONCE
Refills: 0 | Status: COMPLETED | OUTPATIENT
Start: 2024-01-29 | End: 2024-01-29

## 2024-01-29 RX ADMIN — Medication 1000 MILLIGRAM(S): at 10:54

## 2024-01-29 RX ADMIN — Medication 1000 MILLIGRAM(S): at 10:42

## 2024-01-29 NOTE — ASU DISCHARGE PLAN (ADULT/PEDIATRIC) - NS MD DC FALL RISK RISK
For information on Fall & Injury Prevention, visit: [email or web address] OR  [email or web address] OR  https://www.cdc.gov/steadi/patient.html

## 2024-01-29 NOTE — ASU DISCHARGE PLAN (ADULT/PEDIATRIC) - CARE PROVIDER_API CALL
Kenneth Roblero  Orthopaedic Surgery  [address] [facility] [place] [address]  Phone: [phone]  Fax: [phone]  Follow Up Time:

## 2024-01-29 NOTE — PRE-ANESTHESIA EVALUATION ADULT - NSANTHTOBACCOSD_GEN_ALL_CORE
Physical Therapy  Visit Type: treatment  Precautions:  Medical precautions:  fall risk;. 62 yr old female admitted with fall and knee pain  Lines:     Basic Lines: external catheter.      Lines in chart and on patient reviewed, cautions maintained throughout session.      SUBJECTIVE                                                                                                            Patient agreed to participate in therapy this date.  Pt states they can not find chair for her to sit in.      OBJECTIVE                                                                                                                Oriented to person and place     Affect/Behavior: sleepy and cooperative  Functional Communication/Cognition    Overall status:  Impaired    Form of communication:  Verbal    Awareness of deficits: decreased awareness of deficits.  Balance    Sitting: Static: minimal assist and moderate assist, Dynamic: moderate assist  Bed Mobility:      Repositioning in bed: minimal assist (Pt able to scoot up on in bed when placed in trendelenberg position)    Supine to sit: minimal assist (with use of bedrail and HOB elevated)    Sit to supine: maximal assist (assist of 2)  Training completed:      Education details: patient safety and body mechanics  Transfers:      Sit to stand: not attempted due to safety concerns        Interventions                                                                                                       Supine    Lower Extremity: Bilateral: heel slides, hip abduction/adduction and ankle pumps, AAROM, 10 reps, 1 sets  Seated    Lower Extremity: Bilateral: knee extensions, 5 reps, 1 sets  Training provided: HEP training, bed mobility training and safety training  Sitting balance        ASSESSMENT                                                                                                                Impairments: strength, balance deficits, activity tolerance and body  habitus  Functional Limitations: bed mobility, sit to/from stand transfers and ambulation  Pt is a 62 yr old female admitted s/p fall and weakness bilat LEs.     Pt seen in 2 [place].  Pt continues to have jerky movements of (B) UE's which make it difficult for pt to hold onto bedrail and maintain sitting balance without assist.  Pt demonstrated supine to sit with min assist, but sat on edge of bed with posterior trunk lean and decreased control of (B) UE's for UE support.  Did not perform transfer due to safety concerns.  Pt was assisted with 2 person assist back into bed.  Once in supine, pt able to scoot up toward head of bed with min assist.  Pt's LE without jerky movements, only UE's when pt was sitting in edge of bed.  Due to pt's body habitus and uncontrolled UE movements, defer transfers and gait til next session.       Discharge Recommendations   Recommendation for Discharge: PT IL: Patient needs daily, skilled therapy for 1-3 hours a day by at least two disciplines                      Recommendation for Discharge: PT IL: Patient needs daily, skilled therapy for 1-3 hours a day by at least two disciplines                     Skilled therapy is required to address these limitations in attempt to maximize the patient's independence.  Progress: slow progress, decreased activity tolerance and slow progress, medical status limitations    End of Session:   Location: in bed  Safety measures: alarm system in place/re-engaged, bed rails x3, call light within reach, equipment intact and lines intact  Handoff to: nurse            PLAN                                                                                                                            Suggestions for next session as indicated: PT Frequency: 5 days/week  Frequency Comments: 3/5 last seen 8/27(R) : keep HUGOK/RR/HH    Interventions: balance, gait training, HEP train/position and functional transfer training  Agreement to plan and goals: patient agrees  with goals and treatment plan        Goals Reviewed: 8/27/2020  GOALS:  Long Term Goals: (to be met by time of discharge from hospital)  Sit to supine: Patient will complete sit to supine independent.  Supine to sit: Patient will complete supine to sit independent.  Sit to stand: Patient will complete sit to stand transfer with small base quad cane and 2-wheeled walker, modified independent.   Ambulation (even): Patient will ambulate on even surface for 125 feet with 2-wheeled walker and small base quad cane.       Documented in the chart in the following areas: Assessment.           No

## 2024-01-29 NOTE — CHART NOTE - NSCHARTNOTEFT_GEN_A_CORE
PACU ANESTHESIA ADMISSION NOTE      Procedure:   Post op diagnosis:      ____  Intubated  TV:______       Rate: ______      FiO2: ______    _x___  Patent Airway    __x__  Full return of protective reflexes    _x___  Full recovery from anesthesia / back to baseline     Vitals:   T: 98          R:   12               BP:112/76                  Sat: 99                 P: 80      Mental Status:  ___x_ Awake   __x___ Alert   _____ Drowsy   _____ Sedated    Nausea/Vomiting:  __x__ NO  ______Yes,   See Post - Op Orders          Pain Scale (0-10):  _____    Treatment: __x__ None    ____ See Post - Op/PCA Orders    Post - Operative Fluids:   ____ Oral   _x___ See Post - Op Orders    Plan: Discharge:   ____Home       __x___Floor     _____Critical Care    _____  Other:_________________    Comments:
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
## 2024-02-02 DIAGNOSIS — M75.52 BURSITIS OF LEFT SHOULDER: ICD-10-CM

## 2024-02-02 DIAGNOSIS — F41.9 ANXIETY DISORDER, UNSPECIFIED: ICD-10-CM

## 2024-02-02 DIAGNOSIS — C61 MALIGNANT NEOPLASM OF PROSTATE: ICD-10-CM

## 2024-02-02 DIAGNOSIS — F32.A DEPRESSION, UNSPECIFIED: ICD-10-CM
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
## 2024-02-02 DIAGNOSIS — S46.012A STRAIN OF MUSCLE(S) AND TENDON(S) OF THE ROTATOR CUFF OF LEFT SHOULDER, INITIAL ENCOUNTER: ICD-10-CM

## 2024-02-02 DIAGNOSIS — Z87.891 PERSONAL HISTORY OF NICOTINE DEPENDENCE: ICD-10-CM

## 2024-02-02 DIAGNOSIS — M25.812 OTHER SPECIFIED JOINT DISORDERS, LEFT SHOULDER: ICD-10-CM

## 2024-02-02 DIAGNOSIS — I10 ESSENTIAL (PRIMARY) HYPERTENSION: ICD-10-CM

## 2024-02-02 DIAGNOSIS — S43.432A SUPERIOR GLENOID LABRUM LESION OF LEFT SHOULDER, INITIAL ENCOUNTER: ICD-10-CM

## 2024-02-02 DIAGNOSIS — M65.9 SYNOVITIS AND TENOSYNOVITIS, UNSPECIFIED: ICD-10-CM

## 2024-02-08 ENCOUNTER — APPOINTMENT (OUTPATIENT)
Dept: ORTHOPEDIC SURGERY | Facility: CLINIC | Age: 69
End: 2024-02-08
Payer: COMMERCIAL

## 2024-02-08 PROCEDURE — 99024 POSTOP FOLLOW-UP VISIT: CPT

## 2024-02-11 NOTE — HISTORY OF PRESENT ILLNESS
[de-identified] : Pt is s/p left shoulder arthroscopy Doing well. Pain controlled  NAD Left shoulder Incisions healed Mild diffuse TTP Compartments soft and NT ROM limited secondary to pain NVI  s/p left shoulder arthroscopy went over the surgery in detail will start pt, protocol provided continue pain control as needed fu in 1 month all questions answered

## 2024-03-21 ENCOUNTER — APPOINTMENT (OUTPATIENT)
Dept: ORTHOPEDIC SURGERY | Facility: CLINIC | Age: 69
End: 2024-03-21
Payer: COMMERCIAL

## 2024-03-21 PROCEDURE — 99024 POSTOP FOLLOW-UP VISIT: CPT

## 2024-03-21 NOTE — HISTORY OF PRESENT ILLNESS
[de-identified] : Pt is s/p left shoulder arthroscopy Doing well. Pain controlled  NAD Left shoulder Incisions healed Mild diffuse TTP Compartments soft and NT  ER 30 IR L5 NVI  s/p left shoulder arthroscopy went over the surgery in detail cont hep and stretching fu in 2-3 months

## 2024-03-25 NOTE — ED ADULT NURSE NOTE - NSHOSCREENINGQ1_ED_ALL_ED
----- Message from Emerald Cisneros LPN sent at 3/15/2024  1:56 PM EDT -----   TICKLE to call him in 10 days.  Has he resumed lisinopril at 10 mg daily?  How are his blood pressures doing?  Thanks.   No

## 2024-05-10 ENCOUNTER — APPOINTMENT (OUTPATIENT)
Dept: ORTHOPEDIC SURGERY | Facility: CLINIC | Age: 69
End: 2024-05-10
Payer: COMMERCIAL

## 2024-05-10 DIAGNOSIS — M25.512 PAIN IN LEFT SHOULDER: ICD-10-CM

## 2024-05-10 PROCEDURE — 99213 OFFICE O/P EST LOW 20 MIN: CPT

## 2024-05-10 NOTE — HISTORY OF PRESENT ILLNESS
[de-identified] : Pt is s/p left shoulder arthroscopy Doing well. Pain controlled  had a fall in shower but pain lat, rom is still preserved  NAD Left shoulder Incisions healed Mild diffuse TTP Compartments soft and NT  ER 50 IR T12 NVI  s/p left shoulder arthroscopy went over the surgery in detail cont hep and stretching rx for shower chair fu in 2-3 months

## 2024-05-29 ENCOUNTER — APPOINTMENT (OUTPATIENT)
Dept: UROLOGY | Facility: CLINIC | Age: 69
End: 2024-05-29
Payer: COMMERCIAL

## 2024-05-29 VITALS
DIASTOLIC BLOOD PRESSURE: 83 MMHG | HEIGHT: 73 IN | TEMPERATURE: 97.6 F | HEART RATE: 68 BPM | WEIGHT: 245 LBS | SYSTOLIC BLOOD PRESSURE: 138 MMHG | BODY MASS INDEX: 32.47 KG/M2 | OXYGEN SATURATION: 98 % | RESPIRATION RATE: 18 BRPM

## 2024-05-29 LAB
BILIRUB UR QL STRIP: NORMAL
COLLECTION METHOD: NORMAL
GLUCOSE UR-MCNC: NORMAL
HCG UR QL: 0.2 EU/DL
HGB UR QL STRIP.AUTO: NORMAL
KETONES UR-MCNC: NORMAL
LEUKOCYTE ESTERASE UR QL STRIP: NORMAL
NITRITE UR QL STRIP: NORMAL
PH UR STRIP: 7
PROT UR STRIP-MCNC: 100
SP GR UR STRIP: 1.01

## 2024-05-29 PROCEDURE — 99214 OFFICE O/P EST MOD 30 MIN: CPT | Mod: 25

## 2024-05-29 PROCEDURE — 51798 US URINE CAPACITY MEASURE: CPT

## 2024-05-29 PROCEDURE — 81003 URINALYSIS AUTO W/O SCOPE: CPT | Mod: QW

## 2024-05-29 PROCEDURE — G2211 COMPLEX E/M VISIT ADD ON: CPT | Mod: NC

## 2024-05-29 RX ORDER — OXYBUTYNIN CHLORIDE 10 MG/1
10 TABLET, EXTENDED RELEASE ORAL
Qty: 60 | Refills: 3 | Status: DISCONTINUED | COMMUNITY
Start: 2018-08-22 | End: 2024-05-29

## 2024-05-29 RX ORDER — SOLIFENACIN SUCCINATE 5 MG/1
5 TABLET, FILM COATED ORAL
Qty: 30 | Refills: 5 | Status: DISCONTINUED | COMMUNITY
Start: 2017-11-22 | End: 2024-05-29

## 2024-05-29 RX ORDER — OXYBUTYNIN CHLORIDE 10 MG/1
10 TABLET, EXTENDED RELEASE ORAL
Qty: 30 | Refills: 5 | Status: DISCONTINUED | COMMUNITY
Start: 2017-11-22 | End: 2024-05-29

## 2024-05-29 NOTE — HISTORY OF PRESENT ILLNESS
[Urinary Urgency] : urinary urgency [Urinary Frequency] : urinary frequency [Nocturia] : nocturia [Post-Void Dribbling] : post-void dribbling [None] : None [FreeTextEntry1] : 68 year old male with h/o prostate carcinoma, stage T1c., group 1, very  low risk (AS since 2019), s/p EBRT complete in 11/2021 with Dr. Banegas due to increased prostate cancer volume [reclassification ]  reported on active surveillance biopsy and continued rising of PSA.   patient maintained on Flomax and has used anticholinergics in the past but has since stopped and doesn't want to re-start these meds  denies dysuria, hematuria or fevers s/p left rotator cuff repair January 2024 he c/o increased frequency with urgency and nocturia    All past and previous data reviewed: 2019 TRUS BX= LLM 3+3 13% [Original prostate biopsy]// 2018- pre biopsy- 6.2. AS #1 TRUS BX 9/29/20- pathology- carrie 3+3=6 in 3 cores, left lat mid, left base, left apex- aprox 20-22% of tissue.  05/29/2024- today- bladder scan- 6ml         Udip- trace leuk 03/2022 bladder scan= 0 cc // UA= BLO moderate, KALEY small 2/2021 Bladder scan: 26 cc (Bladder is not filled enough for uroflow)// US = Bladder wall thickness 0.7 cm. Pre void= 154 cc Post void= 11 cc (No feeling of fullness )  PSA-     05/2024- 1.1              11/2023-  0.7             05/2023=  1.0             11/2022- 1.0    ** this PSA was done by the pts pcp and this is a verbal report              9/2022- 1.2              9/28/22   udip- neg           6/2022 = 0.8            3/2022= 0.7          11/2021= 3.9 ***** post EBRT            8/2021= 7.9   5/2021= 7.0 (covid vaccine 2 weeks prior)   1/2021= 5.1  PSMA PET ctscan-  11/9/22-   no evidence of suspicious PSMA uptake abd/pelvic ctscan- 2018- 6.5 x 5mm LP right kidney stone, 11 x 8.5mm LP left kidney stone, 10 x10 mm mid-lower pole left kidney stone   KUB- (done at [facility] radiology- unable to view online)-6/2023- 7mm right renal calcification, 3mm calcification LP left kidney KUB- 12/2024-  8mm left kidney stone, 2mm right kidney stone (viewed online)  [Straining] : no straining [Weak Stream] : no weak stream [Dysuria] : no dysuria [Fever] : no fever

## 2024-05-29 NOTE — ASSESSMENT
[FreeTextEntry1] : 1. Prostate carcinoma, stage TI C., group 1, very low risk (AS since 2019), s/p EBRT with Dr. Banegas 11/2/2021. - slight rise in PSA -  ? bounce 2. Bilateral nephrolithiasis -- 4. LUTS - specifically nocturia --DO , related to EBRT?- maintained on Flomax 0.4mg- no PVR demonstrated  Plan: -repeat PSA in 6 months- discussed PSA bounce   -advised pt to try and stop flomax as this may be worsening his frequency- he will call me in 1-2 weeks to discuss- if no improvement consider myrbetriq -rto 6 months  total time spent with encounter including face to face time with patient and review of chart and plan totaled 30 min

## 2024-05-29 NOTE — PHYSICAL EXAM
[General Appearance - Well Developed] : well developed [General Appearance - Well Nourished] : well nourished [Normal Appearance] : normal appearance [Well Groomed] : well groomed [General Appearance - In No Acute Distress] : no acute distress [Skin Color & Pigmentation] : normal skin color and pigmentation [Skin Lesions] : no skin lesions [] : no respiratory distress [Oriented To Time, Place, And Person] : oriented to person, place, and time [Affect] : the affect was normal [Mood] : the mood was normal [Not Anxious] : not anxious [Normal Station and Gait] : the gait and station were normal for the patient's age [No Focal Deficits] : no focal deficits [Motor Exam] : the motor exam was normal [FreeTextEntry1] : torn left rotator cuff

## 2024-06-10 ENCOUNTER — APPOINTMENT (OUTPATIENT)
Dept: GASTROENTEROLOGY | Facility: CLINIC | Age: 69
End: 2024-06-10

## 2024-06-10 VITALS
WEIGHT: 250 LBS | HEART RATE: 78 BPM | OXYGEN SATURATION: 98 % | BODY MASS INDEX: 33.13 KG/M2 | HEIGHT: 73 IN | SYSTOLIC BLOOD PRESSURE: 130 MMHG | DIASTOLIC BLOOD PRESSURE: 84 MMHG

## 2024-06-10 DIAGNOSIS — Z83.79 FAMILY HISTORY OF OTHER DISEASES OF THE DIGESTIVE SYSTEM: ICD-10-CM

## 2024-06-10 DIAGNOSIS — R19.5 OTHER FECAL ABNORMALITIES: ICD-10-CM

## 2024-06-10 DIAGNOSIS — Z12.11 ENCOUNTER FOR SCREENING FOR MALIGNANT NEOPLASM OF COLON: ICD-10-CM

## 2024-06-10 DIAGNOSIS — Z87.891 PERSONAL HISTORY OF NICOTINE DEPENDENCE: ICD-10-CM

## 2024-06-10 DIAGNOSIS — Z86.79 PERSONAL HISTORY OF OTHER DISEASES OF THE CIRCULATORY SYSTEM: ICD-10-CM

## 2024-06-10 PROCEDURE — 99204 OFFICE O/P NEW MOD 45 MIN: CPT

## 2024-06-10 RX ORDER — DICLOFENAC SODIUM 50 MG/1
50 TABLET, DELAYED RELEASE ORAL
Qty: 60 | Refills: 1 | Status: DISCONTINUED | COMMUNITY
Start: 2023-10-19 | End: 2024-06-10

## 2024-06-10 RX ORDER — SOLIFENACIN SUCCINATE 5 MG/1
5 TABLET ORAL
Qty: 30 | Refills: 5 | Status: DISCONTINUED | COMMUNITY
Start: 2021-02-25 | End: 2024-06-10

## 2024-06-10 RX ORDER — OXYCODONE AND ACETAMINOPHEN 5; 325 MG/1; MG/1
5-325 TABLET ORAL
Qty: 30 | Refills: 0 | Status: DISCONTINUED | COMMUNITY
Start: 2024-01-29 | End: 2024-06-10

## 2024-06-10 RX ORDER — CIPROFLOXACIN HYDROCHLORIDE 500 MG/1
500 TABLET, FILM COATED ORAL
Qty: 6 | Refills: 1 | Status: DISCONTINUED | COMMUNITY
Start: 2018-11-28 | End: 2024-06-10

## 2024-06-10 RX ORDER — SODIUM SULFATE, POTASSIUM SULFATE AND MAGNESIUM SULFATE 1.6; 3.13; 17.5 G/177ML; G/177ML; G/177ML
17.5-3.13-1.6 SOLUTION ORAL
Qty: 2 | Refills: 0 | Status: ACTIVE | COMMUNITY
Start: 2024-06-10 | End: 1900-01-01

## 2024-06-10 RX ORDER — MIRABEGRON 50 MG/1
50 TABLET, FILM COATED, EXTENDED RELEASE ORAL
Qty: 30 | Refills: 3 | Status: DISCONTINUED | COMMUNITY
Start: 2019-10-10 | End: 2024-06-10

## 2024-06-10 RX ORDER — OXYBUTYNIN CHLORIDE 10 MG/1
10 TABLET, EXTENDED RELEASE ORAL
Qty: 90 | Refills: 3 | Status: DISCONTINUED | COMMUNITY
Start: 2019-02-01 | End: 2024-06-10

## 2024-06-10 RX ORDER — TRAMADOL HYDROCHLORIDE 50 MG/1
50 TABLET, COATED ORAL
Qty: 20 | Refills: 0 | Status: DISCONTINUED | COMMUNITY
Start: 2024-04-11 | End: 2024-06-10

## 2024-06-10 RX ORDER — MELOXICAM 15 MG/1
15 TABLET ORAL
Qty: 30 | Refills: 0 | Status: DISCONTINUED | COMMUNITY
Start: 2023-05-22 | End: 2024-06-10

## 2024-06-10 RX ORDER — NAPROXEN 500 MG/1
500 TABLET ORAL
Qty: 60 | Refills: 0 | Status: DISCONTINUED | COMMUNITY
Start: 2023-09-07 | End: 2024-06-10

## 2024-06-10 RX ORDER — CIPROFLOXACIN HYDROCHLORIDE 500 MG/1
500 TABLET, FILM COATED ORAL
Qty: 6 | Refills: 1 | Status: DISCONTINUED | COMMUNITY
Start: 2020-08-11 | End: 2024-06-10

## 2024-06-10 RX ORDER — OXYBUTYNIN CHLORIDE 15 MG/1
15 TABLET, EXTENDED RELEASE ORAL
Qty: 30 | Refills: 5 | Status: DISCONTINUED | COMMUNITY
Start: 2018-06-13 | End: 2024-06-10

## 2024-06-10 RX ORDER — AMLODIPINE BESYLATE 10 MG/1
10 TABLET ORAL
Qty: 90 | Refills: 0 | Status: ACTIVE | COMMUNITY
Start: 2024-05-20

## 2024-06-10 RX ORDER — TAMSULOSIN HYDROCHLORIDE 0.4 MG/1
0.4 CAPSULE ORAL
Qty: 90 | Refills: 3 | Status: DISCONTINUED | COMMUNITY
Start: 2022-04-27 | End: 2024-06-10

## 2024-06-10 NOTE — PHYSICAL EXAM
[Alert] : alert [Normal Voice/Communication] : normal voice/communication [No Acute Distress] : no acute distress [Well Developed] : well developed [Obese (BMI >= 30)] : obese (BMI >= 30) [Sclera] : the sclera and conjunctiva were normal [Hearing Threshold Finger Rub Not [place]] : hearing was normal [Normal Lips/Gums] : the lips and gums were normal [Normal Appearance] : the appearance of the neck was normal [No Respiratory Distress] : no respiratory distress [No Acc Muscle Use] : no accessory muscle use [Respiration, Rhythm And Depth] : normal respiratory rhythm and effort [Auscultation Breath Sounds / Voice Sounds] : lungs were clear to auscultation bilaterally [Heart Rate And Rhythm] : heart rate was normal and rhythm regular [Normal S1, S2] : normal S1 and S2 [Bowel Sounds] : normal bowel sounds [Abdomen Tenderness] : non-tender [No Masses] : no abdominal mass palpated [Abdomen Soft] : soft [Abnormal Walk] : normal gait [Normal Color / Pigmentation] : normal skin color and pigmentation [Oriented To Time, Place, And Person] : oriented to person, place, and time

## 2024-06-10 NOTE — HISTORY OF PRESENT ILLNESS
[FreeTextEntry1] : 68 yr old male with h/O Prostate Cancer (S/P RT), HTN, Kidney Stones, Depression, Anxiety, is here for CRC Screening. His last colonoscopy was done in 2016 at [facility] (unsure if he had polyps). He denied heartburn, dysphagia, vomiting, abdominal pain, constipation, diarrhea, weight loss, or blood in the stool. He denied a Family H/O colon, gastric, or other GI cancers. His mother had UC. He stated that he had a Cologuard test done a few months ago which was positive.

## 2024-06-10 NOTE — ASSESSMENT
[FreeTextEntry1] : 68 yr old male with h/O Prostate Cancer (S/P RT), HTN, Kidney Stones, Depression, Anxiety, is here for CRC Screening. His last colonoscopy was done in 2016 at [facility] (unsure if he had polyps). He denied heartburn, dysphagia, vomiting, abdominal pain, constipation, diarrhea, weight loss, or blood in the stool. He denied a Family H/O colon, gastric, or other GI cancers. His mother had UC. He stated that he had a Cologuard test done recently and was positive.    Positive cologuard test CRC Screening - Will schedule a colonoscopy; risks and benefits discussed - F/U after the procedure is done

## 2024-06-19 ENCOUNTER — OUTPATIENT (OUTPATIENT)
Dept: OUTPATIENT SERVICES | Facility: HOSPITAL | Age: 69
LOS: 1 days | Discharge: HOME | End: 2024-06-19
Payer: COMMERCIAL

## 2024-06-19 ENCOUNTER — RESULT REVIEW (OUTPATIENT)
Age: 69
End: 2024-06-19

## 2024-06-19 ENCOUNTER — TRANSCRIPTION ENCOUNTER (OUTPATIENT)
Age: 69
End: 2024-06-19

## 2024-06-19 VITALS
RESPIRATION RATE: 973 BRPM | DIASTOLIC BLOOD PRESSURE: 82 MMHG | WEIGHT: 244.93 LBS | TEMPERATURE: 97 F | SYSTOLIC BLOOD PRESSURE: 150 MMHG | OXYGEN SATURATION: 99 % | HEART RATE: 67 BPM | HEIGHT: 73 IN

## 2024-06-19 VITALS
TEMPERATURE: 97 F | RESPIRATION RATE: 18 BRPM | DIASTOLIC BLOOD PRESSURE: 77 MMHG | HEART RATE: 66 BPM | SYSTOLIC BLOOD PRESSURE: 131 MMHG | OXYGEN SATURATION: 99 %

## 2024-06-19 DIAGNOSIS — K57.30 DIVERTICULOSIS OF LARGE INTESTINE WITHOUT PERFORATION OR ABSCESS WITHOUT BLEEDING: ICD-10-CM

## 2024-06-19 DIAGNOSIS — F41.9 ANXIETY DISORDER, UNSPECIFIED: ICD-10-CM

## 2024-06-19 DIAGNOSIS — F32.A DEPRESSION, UNSPECIFIED: ICD-10-CM

## 2024-06-19 DIAGNOSIS — I10 ESSENTIAL (PRIMARY) HYPERTENSION: ICD-10-CM

## 2024-06-19 DIAGNOSIS — Z98.890 OTHER SPECIFIED POSTPROCEDURAL STATES: Chronic | ICD-10-CM

## 2024-06-19 DIAGNOSIS — K64.8 OTHER HEMORRHOIDS: ICD-10-CM

## 2024-06-19 DIAGNOSIS — K63.5 POLYP OF COLON: ICD-10-CM

## 2024-06-19 DIAGNOSIS — Z12.11 ENCOUNTER FOR SCREENING FOR MALIGNANT NEOPLASM OF COLON: ICD-10-CM

## 2024-06-19 DIAGNOSIS — C61 MALIGNANT NEOPLASM OF PROSTATE: ICD-10-CM

## 2024-06-19 DIAGNOSIS — R19.5 OTHER FECAL ABNORMALITIES: ICD-10-CM

## 2024-06-19 DIAGNOSIS — K64.9 UNSPECIFIED HEMORRHOIDS: ICD-10-CM

## 2024-06-19 PROCEDURE — 45380 COLONOSCOPY AND BIOPSY: CPT | Mod: XU

## 2024-06-19 PROCEDURE — 88305 TISSUE EXAM BY PATHOLOGIST: CPT

## 2024-06-19 PROCEDURE — 88305 TISSUE EXAM BY PATHOLOGIST: CPT | Mod: 26

## 2024-06-19 PROCEDURE — 45385 COLONOSCOPY W/LESION REMOVAL: CPT

## 2024-06-19 RX ORDER — CLONAZEPAM 1 MG
1 TABLET ORAL
Refills: 0 | DISCHARGE

## 2024-06-19 RX ORDER — ATENOLOL 25 MG/1
1 TABLET ORAL
Qty: 0 | Refills: 0 | DISCHARGE

## 2024-06-19 RX ORDER — AMLODIPINE BESYLATE 2.5 MG/1
1 TABLET ORAL
Qty: 0 | Refills: 0 | DISCHARGE

## 2024-06-19 RX ORDER — TAMSULOSIN HYDROCHLORIDE 0.4 MG/1
1 CAPSULE ORAL
Refills: 0 | DISCHARGE

## 2024-06-19 RX ORDER — IRBESARTAN 75 MG/1
1 TABLET ORAL
Qty: 0 | Refills: 0 | DISCHARGE

## 2024-06-19 NOTE — ASU DISCHARGE PLAN (ADULT/PEDIATRIC) - NS MD DC FALL RISK RISK
For information on Fall & Injury Prevention, visit: [email or web address] OR  [email or web address] OR  https://www.cdc.gov/steadi/patient.html

## 2024-06-19 NOTE — ASU PATIENT PROFILE, ADULT - FALL HARM RISK - UNIVERSAL INTERVENTIONS
Bed in lowest position, wheels locked, appropriate side rails in place/Call bell, personal items and telephone in reach/Instruct patient to call for assistance before getting out of bed or chair/Non-slip footwear when patient is out of bed/Smith to call system/Physically safe environment - no spills, clutter or unnecessary equipment/Purposeful Proactive Rounding/Room/bathroom lighting operational, light cord in reach

## 2024-06-19 NOTE — ASU PATIENT PROFILE, ADULT - PATIENT REPRESENTATIVE: ( YOU CAN CHOOSE ANY PERSON THAT CAN ASSIST YOU WITH YOUR HEALTH CARE PREFERENCES, DOES NOT HAVE TO BE A SPOUSE, IMMEDIATE FAMILY OR SIGNIFICANT OTHER/PARTNER)
Catherine spoke to PT to came in for a cast   
Patient missed a call because her phone is not showing some calls.  She asks for a return call and asks that if you have to leave a voice message, to please leave the time and office location she needs to come to for an appointment today.  
Declines

## 2024-06-19 NOTE — ASU DISCHARGE PLAN (ADULT/PEDIATRIC) - CALL YOUR DOCTOR IF YOU HAVE ANY OF THE FOLLOWING:
Bleeding that does not stop/Swelling that gets worse/Pain not relieved by Medications/Fever greater than (need to indicate Fahrenheit or Celsius)/Wound/Surgical Site with redness, or foul smelling discharge or pus/Numbness, tingling, color or temperature change to extremity/Nausea and vomiting that does not stop Bleeding that does not stop/Swelling that gets worse/Pain not relieved by Medications/Fever greater than (need to indicate Fahrenheit or Celsius)/Wound/Surgical Site with redness, or foul smelling discharge or pus/Numbness, tingling, color or temperature change to extremity/Nausea and vomiting that does not stop/Excessive diarrhea

## 2024-06-20 LAB — SURGICAL PATHOLOGY STUDY: SIGNIFICANT CHANGE UP

## 2024-07-08 ENCOUNTER — APPOINTMENT (OUTPATIENT)
Dept: GASTROENTEROLOGY | Facility: CLINIC | Age: 69
End: 2024-07-08
Payer: COMMERCIAL

## 2024-07-08 VITALS
WEIGHT: 253 LBS | DIASTOLIC BLOOD PRESSURE: 80 MMHG | SYSTOLIC BLOOD PRESSURE: 122 MMHG | HEART RATE: 98 BPM | OXYGEN SATURATION: 98 % | BODY MASS INDEX: 33.53 KG/M2 | HEIGHT: 73 IN

## 2024-07-08 DIAGNOSIS — R19.5 OTHER FECAL ABNORMALITIES: ICD-10-CM

## 2024-07-08 DIAGNOSIS — Z12.11 ENCOUNTER FOR SCREENING FOR MALIGNANT NEOPLASM OF COLON: ICD-10-CM

## 2024-07-08 PROCEDURE — 99214 OFFICE O/P EST MOD 30 MIN: CPT

## 2024-08-30 ENCOUNTER — APPOINTMENT (OUTPATIENT)
Dept: ORTHOPEDIC SURGERY | Facility: CLINIC | Age: 69
End: 2024-08-30
Payer: COMMERCIAL

## 2024-08-30 DIAGNOSIS — M25.512 PAIN IN LEFT SHOULDER: ICD-10-CM

## 2024-08-30 PROCEDURE — 99213 OFFICE O/P EST LOW 20 MIN: CPT

## 2024-08-30 NOTE — HISTORY OF PRESENT ILLNESS
[de-identified] : Pt is s/p left shoulder arthroscopy Doing well. Pain controlled  NAD Left shoulder Incisions healed Mild diffuse TTP Compartments soft and NT  ER 50 IR L1 NVI  s/p left shoulder arthroscopy went over the surgery in detail cont hep and stretching sicne doing wlel, will cont pt, hep, pain control

## 2024-09-17 NOTE — REASON FOR VISIT
What Type Of Note Output Would You Prefer (Optional)?: Standard Output How Severe Is Your Rash?: moderate Is This A New Presentation, Or A Follow-Up?: Rash Additional History: Pt states he thinks he is having some type of reaction to the black ink in his tattoos because the rash is only on spots where the black ink is present.\\nThe tattoos are old, he has had them for years. Spots are not present anywhere where there is no tattoo ink or other ink colors are present; only on black ink locations on BL upper and lower extremities.\\nPt states the bumps popped up around May. They are not painful if he is not picking at them and they do not itch. they have remained constant.\\n Pt was prescribed Triamcinolone by his PCP, which he used for one day and said it may have partially helped but it did not clear and he was not consistent with the application. [Follow-up Visit ___] : a follow-up visit  for [unfilled] [Spouse] : spouse

## 2024-09-25 ENCOUNTER — APPOINTMENT (OUTPATIENT)
Facility: CLINIC | Age: 69
End: 2024-09-25
Payer: COMMERCIAL

## 2024-09-25 VITALS — HEIGHT: 73 IN | BODY MASS INDEX: 33.13 KG/M2 | WEIGHT: 250 LBS

## 2024-09-25 DIAGNOSIS — M54.16 RADICULOPATHY, LUMBAR REGION: ICD-10-CM

## 2024-09-25 PROCEDURE — 72110 X-RAY EXAM L-2 SPINE 4/>VWS: CPT

## 2024-09-25 PROCEDURE — 99204 OFFICE O/P NEW MOD 45 MIN: CPT

## 2024-09-25 RX ORDER — MELOXICAM 15 MG/1
15 TABLET ORAL
Qty: 30 | Refills: 2 | Status: ACTIVE | COMMUNITY
Start: 2024-09-25 | End: 1900-01-01

## 2024-09-25 NOTE — IMAGING
[de-identified] : TTP midline spine and paraspinal musculature  Strength                                          Hip flexor   Right: 5/5; Left: 5/5                              Knee extensor     Right: 5/5; Left: 5/5                      Ankle dorsiflexion   Right: 5/5; Left: 5/5                   EHL           Right: 5/5; Left: 5/5                                 Ankle plantarflexion       Right: 5/5; Left: 5/5  Sensation L1   Right: 2/2; Left: 2/2 L2   Right: 2/2; Left: 2/2 L3   Right: 2/2; Left: 2/2 L4   Right: 2/2; Left: 2/2 L5   Right: 2/2; Left: 2/2 S1   Right: 2/2; Left: 2/2  Reflexes Patella   Right: 2+; Left 2+ Achilles   Right: 2+; Left 2+ Clonus  Right: absent; L: absent

## 2024-09-25 NOTE — DISCUSSION/SUMMARY
[de-identified] : 69-year-old male presents to me with lumbar radiculopathy.  I reviewed his imaging with him.  I am ordering him a prescription for physical therapy and meloxicam.  He will follow-up if that does not help his pain and we will then consider ordering MRI of his lumbar spine.

## 2024-09-25 NOTE — HISTORY OF PRESENT ILLNESS
[de-identified] : 69-year-old male presents with low back pain.  Radiates down his right leg.  He has difficulty walking because of this.  Is been going on for about a week.  Basically been stable has been taking meloxicam and Tylenol.  He feels like the meloxicam helps him most.  No loss of bladder or bowel.  He is never has this in the past.  He has not been to pain management.

## 2024-09-25 NOTE — DATA REVIEWED
[FreeTextEntry1] : I obtained and reviewed AP lateral flexion-extension lumbar x-rays notes abundant fractures no dislocations.  There is age-appropriate space degeneration.

## 2024-09-26 RX ORDER — CYCLOBENZAPRINE HYDROCHLORIDE 5 MG/1
5 TABLET, FILM COATED ORAL 3 TIMES DAILY
Qty: 15 | Refills: 2 | Status: ACTIVE | COMMUNITY
Start: 2024-09-26 | End: 1900-01-01

## 2024-11-27 ENCOUNTER — APPOINTMENT (OUTPATIENT)
Dept: UROLOGY | Facility: CLINIC | Age: 69
End: 2024-11-27
Payer: COMMERCIAL

## 2024-11-27 VITALS
WEIGHT: 245 LBS | HEART RATE: 66 BPM | HEIGHT: 73 IN | TEMPERATURE: 97.9 F | DIASTOLIC BLOOD PRESSURE: 81 MMHG | BODY MASS INDEX: 32.47 KG/M2 | SYSTOLIC BLOOD PRESSURE: 143 MMHG | OXYGEN SATURATION: 97 %

## 2024-11-27 DIAGNOSIS — C61 MALIGNANT NEOPLASM OF PROSTATE: ICD-10-CM

## 2024-11-27 LAB
BILIRUB UR QL STRIP: NORMAL
COLLECTION METHOD: NORMAL
GLUCOSE UR-MCNC: NORMAL
HCG UR QL: 0.2 EU/DL
HGB UR QL STRIP.AUTO: NORMAL
KETONES UR-MCNC: NORMAL
LEUKOCYTE ESTERASE UR QL STRIP: NORMAL
NITRITE UR QL STRIP: NORMAL
PH UR STRIP: 7.5
PROT UR STRIP-MCNC: NORMAL
SP GR UR STRIP: 1.02

## 2024-11-27 PROCEDURE — 99213 OFFICE O/P EST LOW 20 MIN: CPT

## 2024-11-27 PROCEDURE — G2211 COMPLEX E/M VISIT ADD ON: CPT | Mod: NC

## 2024-11-27 PROCEDURE — 81003 URINALYSIS AUTO W/O SCOPE: CPT | Mod: QW

## 2024-12-25 PROBLEM — F10.90 ALCOHOL USE: Status: INACTIVE | Noted: 2017-10-03

## 2025-01-17 NOTE — PATIENT PROFILE ADULT - NSPROPTRIGHTCAREGIVER_GEN_A_NUR
Begin doing back stretches when your pain is little bit better.  You can Google stretching exercises for your quadriceps as well as hamstrings.  Begin a walking exercise program as well.  Start prednisone tomorrow only take as directed.  You may alternate your tizanidine with diclofenac that you are already on.  Be sure to practice good biomechanics and pain from your knee is not your low back when lifting.  If symptoms persist I would recommend to see her family doctor for further recommendations.  
no

## 2025-04-04 NOTE — ASU DISCHARGE PLAN (ADULT/PEDIATRIC). - ACTIVITY LEVEL
Continued Stay SW/CM Assessment/Plan of Care Note     Progress note:  Talked w/team and pt not medically ready at this time. Pt to have another EGD today to watch for GI bleeding. Per SW yesterday pt was at [place] and was to go home soon so pending how pt is doing will need to f/u SNF vs HH.     See SW/CM flowsheets for other objective data.    Disposition Recommendations:  Preliminary discharge destination: Planned Discharge Destination: Location not determined at this time  SW/CM recommendation for discharge: SNF      Discharge Plan/Needs:     Continued Care and Services - Admitted Since 4/2/2025       Destination  Coordination complete.      Service Provider Request Status Selected Services Address Phone Fax    [facility]  Selected Skilled Nursing [address] [id number] [phone]                    Prior To Hospitalization:    Living Situation: Other facility residents and residing at Long term care facility    .  Support Systems: Children, Family members, Spouse   Home Devices/Equipment: Shower chair, Elevated toilet seat            Mobility Assist Devices: Cane, Standard walker, Four wheel walker   Type of Service Prior to Hospitalization: Other (comment) (SNF)               Patient/Family discharge goal (s):  SNF       Barriers to Discharge  Identified Barriers to Discharge/Transition Planning: Medical necessity for acute care (IVAB, PO amio, NG clamped, Endo, straight cath, protonix)                   no weight bearing/no heavy lifting

## 2025-05-20 ENCOUNTER — APPOINTMENT (OUTPATIENT)
Dept: UROLOGY | Facility: CLINIC | Age: 70
End: 2025-05-20
Payer: COMMERCIAL

## 2025-05-20 VITALS
RESPIRATION RATE: 18 BRPM | HEIGHT: 73 IN | DIASTOLIC BLOOD PRESSURE: 84 MMHG | OXYGEN SATURATION: 97 % | SYSTOLIC BLOOD PRESSURE: 152 MMHG | BODY MASS INDEX: 33.13 KG/M2 | WEIGHT: 250 LBS | HEART RATE: 62 BPM

## 2025-05-20 DIAGNOSIS — C61 MALIGNANT NEOPLASM OF PROSTATE: ICD-10-CM

## 2025-05-20 DIAGNOSIS — R39.9 UNSPECIFIED SYMPTOMS AND SIGNS INVOLVING THE GENITOURINARY SYSTEM: ICD-10-CM

## 2025-05-20 DIAGNOSIS — N20.0 CALCULUS OF KIDNEY: ICD-10-CM

## 2025-05-20 DIAGNOSIS — R97.20 ELEVATED PROSTATE, SPECIFIC ANTIGEN [PSA]: ICD-10-CM

## 2025-05-20 PROCEDURE — 99213 OFFICE O/P EST LOW 20 MIN: CPT

## 2025-05-20 RX ORDER — PREDNISONE 5 MG/1
5 TABLET ORAL
Refills: 0 | Status: ACTIVE | COMMUNITY

## 2025-05-20 RX ORDER — GABAPENTIN 300 MG/1
300 CAPSULE ORAL
Refills: 0 | Status: ACTIVE | COMMUNITY

## 2025-06-30 ENCOUNTER — APPOINTMENT (OUTPATIENT)
Dept: GASTROENTEROLOGY | Facility: CLINIC | Age: 70
End: 2025-06-30